# Patient Record
Sex: FEMALE | Race: WHITE | NOT HISPANIC OR LATINO | Employment: UNEMPLOYED | URBAN - METROPOLITAN AREA
[De-identification: names, ages, dates, MRNs, and addresses within clinical notes are randomized per-mention and may not be internally consistent; named-entity substitution may affect disease eponyms.]

---

## 2022-11-02 ENCOUNTER — APPOINTMENT (OUTPATIENT)
Dept: GENERAL RADIOLOGY | Facility: HOSPITAL | Age: 67
End: 2022-11-02

## 2022-11-02 ENCOUNTER — HOSPITAL ENCOUNTER (EMERGENCY)
Facility: HOSPITAL | Age: 67
Discharge: HOME | End: 2022-11-02
Attending: EMERGENCY MEDICINE | Admitting: EMERGENCY MEDICINE

## 2022-11-02 VITALS
OXYGEN SATURATION: 97 % | BODY MASS INDEX: 31.02 KG/M2 | RESPIRATION RATE: 16 BRPM | TEMPERATURE: 98.7 F | HEART RATE: 92 BPM | WEIGHT: 158 LBS | SYSTOLIC BLOOD PRESSURE: 149 MMHG | DIASTOLIC BLOOD PRESSURE: 92 MMHG | HEIGHT: 60 IN

## 2022-11-02 DIAGNOSIS — S42.294A OTHER CLOSED NONDISPLACED FRACTURE OF PROXIMAL END OF RIGHT HUMERUS, INITIAL ENCOUNTER: Primary | ICD-10-CM

## 2022-11-02 PROCEDURE — 73030 X-RAY EXAM OF SHOULDER: CPT

## 2022-11-02 PROCEDURE — 73070 X-RAY EXAM OF ELBOW: CPT

## 2022-11-02 PROCEDURE — 73060 X-RAY EXAM OF HUMERUS: CPT

## 2022-11-02 PROCEDURE — 99283 EMERGENCY DEPT VISIT LOW MDM: CPT

## 2022-11-02 RX ORDER — HYDROCODONE BITARTRATE AND ACETAMINOPHEN 5; 325 MG/1; MG/1
1 TABLET ORAL ONCE AS NEEDED
Status: COMPLETED | OUTPATIENT
Start: 2022-11-02 | End: 2022-11-02

## 2022-11-02 RX ORDER — HYDROCODONE BITARTRATE AND ACETAMINOPHEN 5; 325 MG/1; MG/1
1 TABLET ORAL EVERY 6 HOURS PRN
Qty: 15 TABLET | Refills: 0 | Status: SHIPPED | OUTPATIENT
Start: 2022-11-02

## 2022-11-02 RX ADMIN — HYDROCODONE BITARTRATE AND ACETAMINOPHEN 1 TABLET: 5; 325 TABLET ORAL at 18:14

## 2022-11-02 NOTE — DISCHARGE INSTRUCTIONS
Wear sling and swath, may use ice to shoulder intermittently as needed.  Follow-up with orthopedist, return new or worsening symptoms

## 2022-11-02 NOTE — ED PROVIDER NOTES
Subjective   History of Present Illness  67-year-old female presents with pain to right shoulder.  She states the phone rang and she was getting out of bed when she fell earlier today.  She complains of pain to her shoulder worse with any movement.  She describes moderate severe pain constant in nature.  She has no complaints of other pain or injury.  Review of Systems  Negative for head pain neck pain chest pain abdominal pain positive for right hemiparesis from previous stroke  No past medical history on file.  Stroke, clavicle fracture hypothyroid, hypertension, depression, arthritis hyperlipidemia  No Known Allergies    No past surgical history on file.    No family history on file.    Social History     Socioeconomic History   • Marital status:      She is unsure of her medications she is on lisinopril aspirin Prozac Tylenol No. 3 for arthritis something for cholesterol and her thyroid    Objective   Physical Exam  67-year-old female awake alert.  Generally well-developed well-nourished.  She has no complaints of head neck chest abdomen pain.  No complaints of left arm pain no complaints of hip or leg pain.  Examination of right arm she is noted to have paralysis of right hand.  This is pre-existing.  She has no wrist or elbow tenderness.  She has tenderness proximal humerus at the shoulder.  Procedures           ED Course      No results found for this or any previous visit.  XR Shoulder 2+ View Right    Result Date: 11/2/2022  Transverse impaction fracture of the right humeral neck. The humeral head is high riding raising question of rotator cuff osteoarthropathy.  Electronically Signed By-Angel Reaves MD On:11/2/2022 4:19 PM This report was finalized on [id number] by  Angel Reaves MD.    XR Humerus Right    Result Date: 11/2/2022  Transverse impaction fracture of the right humeral neck. This was discussed in detail under the separately dictated right shoulder x-ray report.  Electronically Signed  "By-Angel Reaves MD On:11/2/2022 4:21 PM This report was finalized on [id number] by  Angel Reaves MD.    XR Elbow 2 View Right    Result Date: 11/2/2022  No acute fracture or dislocation.  Electronically Signed By-Angel Reaves MD On:11/2/2022 4:17 PM This report was finalized on [id number] by  Angel Reaves MD.    Medications   HYDROcodone-acetaminophen (NORCO) 5-325 MG per tablet 1 tablet (1 tablet Oral Given 11/2/22 1814)     /81 (BP Location: Left arm, Patient Position: Sitting)   Pulse 87   Temp 98.7 °F (37.1 °C)   Resp 18   Ht 152.4 cm (60\")   Wt 71.7 kg (158 lb)   SpO2 98%   BMI 30.86 kg/m²                                        MDM  I viewed x-rays Of right humerus and shoulder.  There is a transverse impaction fracture of the right humeral neck.  Patient was given a Norco for pain.  Findings were discussed with her and her .  She was placed in sling and swath.  Advised to ice shoulder as needed.  Inspect report was reviewed she was discharged with prescription for Norco.  Advised to follow-up with orthopedist, return new or worsening symptoms  Final diagnoses:   Other closed nondisplaced fracture of proximal end of right humerus, initial encounter       ED Disposition  ED Disposition     ED Disposition   Discharge    Condition   Stable    Comment   --             Geovany Mendoza MD  [address] [place] [address] [place] [address] [phone]    Schedule an appointment as soon as possible for a visit            Medication List      New Prescriptions    HYDROcodone-acetaminophen 5-325 MG per tablet  Commonly known as: NORCO  Take 1 tablet by mouth Every 6 (Six) Hours As Needed for Moderate Pain.           Where to Get Your Medications      These medications were sent to [facility] [address] [place] [address]    Hours: Mon-Fri 7:00AM-7:00PM Phone: [phone]   · HYDROcodone-acetaminophen 5-325 MG per tablet          Jeremiah Sanders MD  11/02/22 1817    "

## 2022-11-17 ENCOUNTER — HOSPITAL ENCOUNTER (INPATIENT)
Facility: HOSPITAL | Age: 67
LOS: 4 days | Discharge: NURSING, REHAB OR LONG-TERM CARE | DRG: 492 | End: 2022-11-23
Attending: EMERGENCY MEDICINE
Payer: MEDICARE

## 2022-11-17 ENCOUNTER — APPOINTMENT (OUTPATIENT)
Dept: GENERAL RADIOLOGY | Facility: HOSPITAL | Age: 67
DRG: 492 | End: 2022-11-17
Payer: MEDICARE
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
## 2022-11-17 DIAGNOSIS — M25.571 ACUTE RIGHT ANKLE PAIN: ICD-10-CM

## 2022-11-17 DIAGNOSIS — M79.671 ACUTE PAIN OF RIGHT FOOT: ICD-10-CM

## 2022-11-17 PROBLEM — Z86.73 HISTORY OF CVA (CEREBROVASCULAR ACCIDENT): Status: ACTIVE | Noted: 2022-11-17

## 2022-11-17 PROBLEM — R53.1 RIGHT SIDED WEAKNESS: Status: ACTIVE | Noted: 2022-11-17

## 2022-11-17 LAB
ALBUMIN SERPL-MCNC: 4.1 G/DL (ref 3.5–5.2)
ALBUMIN/GLOB SERPL: 1.5 G/DL
ALP SERPL-CCNC: 174 U/L (ref 39–117)
ALT SERPL W P-5'-P-CCNC: 16 U/L (ref 1–33)
ANION GAP SERPL CALCULATED.3IONS-SCNC: 12 MMOL/L (ref 5–15)
AST SERPL-CCNC: 16 U/L (ref 1–32)
BASOPHILS # BLD AUTO: 0 10*3/MM3 (ref 0–0.2)
BASOPHILS NFR BLD AUTO: 0.3 % (ref 0–1.5)
BILIRUB SERPL-MCNC: 0.4 MG/DL (ref 0–1.2)
BUN SERPL-MCNC: 14 MG/DL (ref 8–23)
BUN/CREAT SERPL: 13.2 (ref 7–25)
CALCIUM SPEC-SCNC: 9.7 MG/DL (ref 8.6–10.5)
CHLORIDE SERPL-SCNC: 105 MMOL/L (ref 98–107)
CK MB SERPL-CCNC: <1 NG/ML
CK SERPL-CCNC: 64 U/L (ref 20–180)
CO2 SERPL-SCNC: 23 MMOL/L (ref 22–29)
CREAT SERPL-MCNC: 1.06 MG/DL (ref 0.57–1)
DEPRECATED RDW RBC AUTO: 49.4 FL (ref 37–54)
EGFRCR SERPLBLD CKD-EPI 2021: 57.3 ML/MIN/1.73
EOSINOPHIL # BLD AUTO: 0 10*3/MM3 (ref 0–0.4)
EOSINOPHIL NFR BLD AUTO: 0.3 % (ref 0.3–6.2)
ERYTHROCYTE [DISTWIDTH] IN BLOOD BY AUTOMATED COUNT: 15.4 % (ref 12.3–15.4)
GLOBULIN UR ELPH-MCNC: 2.7 GM/DL
GLUCOSE SERPL-MCNC: 110 MG/DL (ref 65–99)
HCT VFR BLD AUTO: 38.7 % (ref 34–46.6)
HGB BLD-MCNC: 12.6 G/DL (ref 12–15.9)
LYMPHOCYTES # BLD AUTO: 0.7 10*3/MM3 (ref 0.7–3.1)
LYMPHOCYTES NFR BLD AUTO: 6.1 % (ref 19.6–45.3)
MCH RBC QN AUTO: 28.4 PG (ref 26.6–33)
MCHC RBC AUTO-ENTMCNC: 32.4 G/DL (ref 31.5–35.7)
MCV RBC AUTO: 87.5 FL (ref 79–97)
MONOCYTES # BLD AUTO: 0.5 10*3/MM3 (ref 0.1–0.9)
MONOCYTES NFR BLD AUTO: 4.9 % (ref 5–12)
NEUTROPHILS NFR BLD AUTO: 88.4 % (ref 42.7–76)
NEUTROPHILS NFR BLD AUTO: 9.9 10*3/MM3 (ref 1.7–7)
NRBC BLD AUTO-RTO: 0 /100 WBC (ref 0–0.2)
PLATELET # BLD AUTO: 361 10*3/MM3 (ref 140–450)
PMV BLD AUTO: 8.7 FL (ref 6–12)
POTASSIUM SERPL-SCNC: 4 MMOL/L (ref 3.5–5.2)
PROT SERPL-MCNC: 6.8 G/DL (ref 6–8.5)
RBC # BLD AUTO: 4.43 10*6/MM3 (ref 3.77–5.28)
SODIUM SERPL-SCNC: 140 MMOL/L (ref 136–145)
WBC NRBC COR # BLD: 11.2 10*3/MM3 (ref 3.4–10.8)

## 2022-11-17 PROCEDURE — 80053 COMPREHEN METABOLIC PANEL: CPT | Performed by: NURSE PRACTITIONER

## 2022-11-17 PROCEDURE — 25010000002 HYDROMORPHONE 1 MG/ML SOLUTION: Performed by: NURSE PRACTITIONER

## 2022-11-17 PROCEDURE — 25010000002 MORPHINE PER 10 MG: Performed by: NURSE PRACTITIONER

## 2022-11-17 PROCEDURE — 25010000002 ONDANSETRON PER 1 MG: Performed by: NURSE PRACTITIONER

## 2022-11-17 PROCEDURE — 85025 COMPLETE CBC W/AUTO DIFF WBC: CPT | Performed by: NURSE PRACTITIONER

## 2022-11-17 PROCEDURE — 73610 X-RAY EXAM OF ANKLE: CPT

## 2022-11-17 PROCEDURE — 99284 EMERGENCY DEPT VISIT MOD MDM: CPT

## 2022-11-17 PROCEDURE — 73630 X-RAY EXAM OF FOOT: CPT

## 2022-11-17 PROCEDURE — 82550 ASSAY OF CK (CPK): CPT | Performed by: NURSE PRACTITIONER

## 2022-11-17 PROCEDURE — 82553 CREATINE MB FRACTION: CPT | Performed by: NURSE PRACTITIONER

## 2022-11-17 RX ORDER — BISACODYL 10 MG
10 SUPPOSITORY, RECTAL RECTAL DAILY PRN
Status: DISCONTINUED | OUTPATIENT
Start: 2022-11-17 | End: 2022-11-23 | Stop reason: HOSPADM

## 2022-11-17 RX ORDER — ONDANSETRON 2 MG/ML
4 INJECTION INTRAMUSCULAR; INTRAVENOUS ONCE
Status: COMPLETED | OUTPATIENT
Start: 2022-11-17 | End: 2022-11-17

## 2022-11-17 RX ORDER — ONDANSETRON 4 MG/1
4 TABLET, FILM COATED ORAL EVERY 6 HOURS PRN
Status: DISCONTINUED | OUTPATIENT
Start: 2022-11-17 | End: 2022-11-18 | Stop reason: SDUPTHER

## 2022-11-17 RX ORDER — ACETAMINOPHEN 650 MG/1
650 SUPPOSITORY RECTAL EVERY 4 HOURS PRN
Status: DISCONTINUED | OUTPATIENT
Start: 2022-11-17 | End: 2022-11-18 | Stop reason: SDUPTHER

## 2022-11-17 RX ORDER — SODIUM CHLORIDE 0.9 % (FLUSH) 0.9 %
10 SYRINGE (ML) INJECTION AS NEEDED
Status: DISCONTINUED | OUTPATIENT
Start: 2022-11-17 | End: 2022-11-23 | Stop reason: HOSPADM

## 2022-11-17 RX ORDER — BISACODYL 5 MG/1
5 TABLET, DELAYED RELEASE ORAL DAILY PRN
Status: DISCONTINUED | OUTPATIENT
Start: 2022-11-17 | End: 2022-11-23 | Stop reason: HOSPADM

## 2022-11-17 RX ORDER — ACETAMINOPHEN 325 MG/1
650 TABLET ORAL EVERY 4 HOURS PRN
Status: DISCONTINUED | OUTPATIENT
Start: 2022-11-17 | End: 2022-11-18 | Stop reason: SDUPTHER

## 2022-11-17 RX ORDER — POLYETHYLENE GLYCOL 3350 17 G/17G
17 POWDER, FOR SOLUTION ORAL DAILY PRN
Status: DISCONTINUED | OUTPATIENT
Start: 2022-11-17 | End: 2022-11-23 | Stop reason: HOSPADM

## 2022-11-17 RX ORDER — ACETAMINOPHEN 160 MG/5ML
650 SOLUTION ORAL EVERY 4 HOURS PRN
Status: DISCONTINUED | OUTPATIENT
Start: 2022-11-17 | End: 2022-11-18 | Stop reason: SDUPTHER

## 2022-11-17 RX ORDER — CHOLECALCIFEROL (VITAMIN D3) 125 MCG
5 CAPSULE ORAL NIGHTLY PRN
Status: DISCONTINUED | OUTPATIENT
Start: 2022-11-17 | End: 2022-11-23 | Stop reason: HOSPADM

## 2022-11-17 RX ORDER — ONDANSETRON 2 MG/ML
4 INJECTION INTRAMUSCULAR; INTRAVENOUS EVERY 6 HOURS PRN
Status: DISCONTINUED | OUTPATIENT
Start: 2022-11-17 | End: 2022-11-18 | Stop reason: SDUPTHER

## 2022-11-17 RX ORDER — SODIUM CHLORIDE 0.9 % (FLUSH) 0.9 %
10 SYRINGE (ML) INJECTION EVERY 12 HOURS SCHEDULED
Status: DISCONTINUED | OUTPATIENT
Start: 2022-11-17 | End: 2022-11-23 | Stop reason: HOSPADM

## 2022-11-17 RX ORDER — AMOXICILLIN 250 MG
2 CAPSULE ORAL 2 TIMES DAILY
Status: DISCONTINUED | OUTPATIENT
Start: 2022-11-18 | End: 2022-11-23 | Stop reason: HOSPADM

## 2022-11-17 RX ADMIN — HYDROMORPHONE HYDROCHLORIDE 0.5 MG: 1 INJECTION, SOLUTION INTRAMUSCULAR; INTRAVENOUS; SUBCUTANEOUS at 23:42

## 2022-11-17 RX ADMIN — MORPHINE SULFATE 4 MG: 4 INJECTION, SOLUTION INTRAMUSCULAR; INTRAVENOUS at 20:46

## 2022-11-17 RX ADMIN — ONDANSETRON 4 MG: 2 INJECTION INTRAMUSCULAR; INTRAVENOUS at 20:46

## 2022-11-18 ENCOUNTER — APPOINTMENT (OUTPATIENT)
Dept: GENERAL RADIOLOGY | Facility: HOSPITAL | Age: 67
DRG: 492 | End: 2022-11-18
Payer: MEDICARE

## 2022-11-18 ENCOUNTER — ANESTHESIA (OUTPATIENT)
Dept: PERIOP | Facility: HOSPITAL | Age: 67
End: 2022-11-18

## 2022-11-18 ENCOUNTER — ANESTHESIA EVENT (OUTPATIENT)
Dept: PERIOP | Facility: HOSPITAL | Age: 67
End: 2022-11-18

## 2022-11-18 PROBLEM — S82.851A TRIMALLEOLAR FRACTURE OF ANKLE, CLOSED, RIGHT, INITIAL ENCOUNTER: Status: ACTIVE | Noted: 2022-11-18

## 2022-11-18 LAB
ANION GAP SERPL CALCULATED.3IONS-SCNC: 17 MMOL/L (ref 5–15)
BASOPHILS # BLD AUTO: 0 10*3/MM3 (ref 0–0.2)
BASOPHILS NFR BLD AUTO: 0.2 % (ref 0–1.5)
BUN SERPL-MCNC: 17 MG/DL (ref 8–23)
BUN/CREAT SERPL: 11.5 (ref 7–25)
CALCIUM SPEC-SCNC: 9.2 MG/DL (ref 8.6–10.5)
CHLORIDE SERPL-SCNC: 103 MMOL/L (ref 98–107)
CO2 SERPL-SCNC: 20 MMOL/L (ref 22–29)
CREAT SERPL-MCNC: 1.48 MG/DL (ref 0.57–1)
DEPRECATED RDW RBC AUTO: 47.3 FL (ref 37–54)
EGFRCR SERPLBLD CKD-EPI 2021: 38.4 ML/MIN/1.73
EOSINOPHIL # BLD AUTO: 0.1 10*3/MM3 (ref 0–0.4)
EOSINOPHIL NFR BLD AUTO: 0.9 % (ref 0.3–6.2)
ERYTHROCYTE [DISTWIDTH] IN BLOOD BY AUTOMATED COUNT: 14.9 % (ref 12.3–15.4)
GLUCOSE SERPL-MCNC: 103 MG/DL (ref 65–99)
HCT VFR BLD AUTO: 35.6 % (ref 34–46.6)
HGB BLD-MCNC: 11.3 G/DL (ref 12–15.9)
LYMPHOCYTES # BLD AUTO: 1.4 10*3/MM3 (ref 0.7–3.1)
LYMPHOCYTES NFR BLD AUTO: 20.5 % (ref 19.6–45.3)
MCH RBC QN AUTO: 28.4 PG (ref 26.6–33)
MCHC RBC AUTO-ENTMCNC: 31.6 G/DL (ref 31.5–35.7)
MCV RBC AUTO: 89.8 FL (ref 79–97)
MONOCYTES # BLD AUTO: 0.7 10*3/MM3 (ref 0.1–0.9)
MONOCYTES NFR BLD AUTO: 10.3 % (ref 5–12)
NEUTROPHILS NFR BLD AUTO: 4.6 10*3/MM3 (ref 1.7–7)
NEUTROPHILS NFR BLD AUTO: 68.1 % (ref 42.7–76)
NRBC BLD AUTO-RTO: 0.1 /100 WBC (ref 0–0.2)
PLATELET # BLD AUTO: 348 10*3/MM3 (ref 140–450)
PMV BLD AUTO: 9 FL (ref 6–12)
POTASSIUM SERPL-SCNC: 4.2 MMOL/L (ref 3.5–5.2)
RBC # BLD AUTO: 3.97 10*6/MM3 (ref 3.77–5.28)
SODIUM SERPL-SCNC: 140 MMOL/L (ref 136–145)
WBC NRBC COR # BLD: 6.8 10*3/MM3 (ref 3.4–10.8)

## 2022-11-18 PROCEDURE — 0QSJ04Z REPOSITION RIGHT FIBULA WITH INTERNAL FIXATION DEVICE, OPEN APPROACH: ICD-10-PCS | Performed by: ORTHOPAEDIC SURGERY

## 2022-11-18 PROCEDURE — 73600 X-RAY EXAM OF ANKLE: CPT

## 2022-11-18 PROCEDURE — 25010000002 HYDROMORPHONE 1 MG/ML SOLUTION

## 2022-11-18 PROCEDURE — C1713 ANCHOR/SCREW BN/BN,TIS/BN: HCPCS | Performed by: ORTHOPAEDIC SURGERY

## 2022-11-18 PROCEDURE — 25010000002 ROPIVACAINE PER 1 MG: Performed by: ANESTHESIOLOGY

## 2022-11-18 PROCEDURE — 25010000002 MORPHINE PER 10 MG: Performed by: ORTHOPAEDIC SURGERY

## 2022-11-18 PROCEDURE — 25010000002 DEXAMETHASONE PER 1 MG: Performed by: ANESTHESIOLOGY

## 2022-11-18 PROCEDURE — 25010000002 PROPOFOL 1000 MG/100ML EMULSION: Performed by: ANESTHESIOLOGY

## 2022-11-18 PROCEDURE — 0QSG04Z REPOSITION RIGHT TIBIA WITH INTERNAL FIXATION DEVICE, OPEN APPROACH: ICD-10-PCS | Performed by: ORTHOPAEDIC SURGERY

## 2022-11-18 PROCEDURE — 85025 COMPLETE CBC W/AUTO DIFF WBC: CPT

## 2022-11-18 PROCEDURE — G0378 HOSPITAL OBSERVATION PER HR: HCPCS

## 2022-11-18 PROCEDURE — 76000 FLUOROSCOPY <1 HR PHYS/QHP: CPT

## 2022-11-18 PROCEDURE — 94799 UNLISTED PULMONARY SVC/PX: CPT

## 2022-11-18 PROCEDURE — 25010000002 CEFAZOLIN PER 500 MG: Performed by: ORTHOPAEDIC SURGERY

## 2022-11-18 PROCEDURE — 25010000002 FENTANYL CITRATE (PF) 50 MCG/ML SOLUTION: Performed by: ANESTHESIOLOGY

## 2022-11-18 PROCEDURE — 36415 COLL VENOUS BLD VENIPUNCTURE: CPT

## 2022-11-18 PROCEDURE — 25010000002 ONDANSETRON PER 1 MG

## 2022-11-18 PROCEDURE — 76942 ECHO GUIDE FOR BIOPSY: CPT | Performed by: ORTHOPAEDIC SURGERY

## 2022-11-18 PROCEDURE — 25010000002 MIDAZOLAM PER 1 MG: Performed by: ANESTHESIOLOGY

## 2022-11-18 PROCEDURE — 80048 BASIC METABOLIC PNL TOTAL CA: CPT

## 2022-11-18 DEVICE — IMPLANTABLE DEVICE
Type: IMPLANTABLE DEVICE | Site: ANKLE | Status: FUNCTIONAL
Brand: ORTHOLOC

## 2022-11-18 DEVICE — IMPLANTABLE DEVICE
Type: IMPLANTABLE DEVICE | Site: ANKLE | Status: FUNCTIONAL
Brand: ORTHOLOC 3DI PLATING SYSTEM

## 2022-11-18 DEVICE — IMPLANTABLE DEVICE
Type: IMPLANTABLE DEVICE | Site: ANKLE | Status: FUNCTIONAL
Brand: ORTHOLOC 3DI

## 2022-11-18 RX ORDER — SODIUM CHLORIDE 9 MG/ML
75 INJECTION, SOLUTION INTRAVENOUS CONTINUOUS
Status: DISCONTINUED | OUTPATIENT
Start: 2022-11-18 | End: 2022-11-19

## 2022-11-18 RX ORDER — OXYCODONE HYDROCHLORIDE 5 MG/1
5 TABLET ORAL EVERY 4 HOURS PRN
Status: DISCONTINUED | OUTPATIENT
Start: 2022-11-18 | End: 2022-11-23 | Stop reason: HOSPADM

## 2022-11-18 RX ORDER — ACETAMINOPHEN 325 MG/1
325 TABLET ORAL EVERY 4 HOURS PRN
Status: DISCONTINUED | OUTPATIENT
Start: 2022-11-18 | End: 2022-11-23 | Stop reason: HOSPADM

## 2022-11-18 RX ORDER — ROPIVACAINE HYDROCHLORIDE 5 MG/ML
INJECTION, SOLUTION EPIDURAL; INFILTRATION; PERINEURAL
Status: COMPLETED | OUTPATIENT
Start: 2022-11-18 | End: 2022-11-18

## 2022-11-18 RX ORDER — LIDOCAINE HYDROCHLORIDE 10 MG/ML
INJECTION, SOLUTION EPIDURAL; INFILTRATION; INTRACAUDAL; PERINEURAL AS NEEDED
Status: DISCONTINUED | OUTPATIENT
Start: 2022-11-18 | End: 2022-11-18 | Stop reason: SURG

## 2022-11-18 RX ORDER — MIDAZOLAM HYDROCHLORIDE 1 MG/ML
INJECTION INTRAMUSCULAR; INTRAVENOUS
Status: COMPLETED | OUTPATIENT
Start: 2022-11-18 | End: 2022-11-18

## 2022-11-18 RX ORDER — ASPIRIN 325 MG
325 TABLET ORAL DAILY
Status: DISCONTINUED | OUTPATIENT
Start: 2022-11-19 | End: 2022-11-23 | Stop reason: HOSPADM

## 2022-11-18 RX ORDER — OXYCODONE HYDROCHLORIDE 5 MG/1
10 TABLET ORAL EVERY 4 HOURS PRN
Status: DISCONTINUED | OUTPATIENT
Start: 2022-11-18 | End: 2022-11-23 | Stop reason: HOSPADM

## 2022-11-18 RX ORDER — ONDANSETRON 2 MG/ML
4 INJECTION INTRAMUSCULAR; INTRAVENOUS EVERY 6 HOURS PRN
Status: DISCONTINUED | OUTPATIENT
Start: 2022-11-18 | End: 2022-11-23 | Stop reason: HOSPADM

## 2022-11-18 RX ORDER — ONDANSETRON 4 MG/1
4 TABLET, FILM COATED ORAL EVERY 6 HOURS PRN
Status: DISCONTINUED | OUTPATIENT
Start: 2022-11-18 | End: 2022-11-23 | Stop reason: HOSPADM

## 2022-11-18 RX ORDER — SODIUM CHLORIDE 0.9 % (FLUSH) 0.9 %
1-10 SYRINGE (ML) INJECTION AS NEEDED
Status: DISCONTINUED | OUTPATIENT
Start: 2022-11-18 | End: 2022-11-23 | Stop reason: HOSPADM

## 2022-11-18 RX ORDER — PROPOFOL 10 MG/ML
INJECTION, EMULSION INTRAVENOUS CONTINUOUS PRN
Status: DISCONTINUED | OUTPATIENT
Start: 2022-11-18 | End: 2022-11-18 | Stop reason: SURG

## 2022-11-18 RX ORDER — SODIUM CHLORIDE 9 MG/ML
40 INJECTION, SOLUTION INTRAVENOUS AS NEEDED
Status: DISCONTINUED | OUTPATIENT
Start: 2022-11-18 | End: 2022-11-23 | Stop reason: HOSPADM

## 2022-11-18 RX ORDER — POLYETHYLENE GLYCOL 3350 17 G/17G
17 POWDER, FOR SOLUTION ORAL DAILY
Status: DISCONTINUED | OUTPATIENT
Start: 2022-11-19 | End: 2022-11-23 | Stop reason: HOSPADM

## 2022-11-18 RX ORDER — DEXAMETHASONE SODIUM PHOSPHATE 4 MG/ML
INJECTION, SOLUTION INTRA-ARTICULAR; INTRALESIONAL; INTRAMUSCULAR; INTRAVENOUS; SOFT TISSUE
Status: COMPLETED | OUTPATIENT
Start: 2022-11-18 | End: 2022-11-18

## 2022-11-18 RX ORDER — HYDROCODONE BITARTRATE AND ACETAMINOPHEN 7.5; 325 MG/1; MG/1
1 TABLET ORAL EVERY 4 HOURS PRN
Status: DISCONTINUED | OUTPATIENT
Start: 2022-11-18 | End: 2022-11-23 | Stop reason: HOSPADM

## 2022-11-18 RX ORDER — FENTANYL CITRATE 50 UG/ML
INJECTION, SOLUTION INTRAMUSCULAR; INTRAVENOUS
Status: COMPLETED | OUTPATIENT
Start: 2022-11-18 | End: 2022-11-18

## 2022-11-18 RX ORDER — NALOXONE HCL 0.4 MG/ML
0.4 VIAL (ML) INJECTION
Status: DISCONTINUED | OUTPATIENT
Start: 2022-11-18 | End: 2022-11-23 | Stop reason: HOSPADM

## 2022-11-18 RX ADMIN — MIDAZOLAM 1.5 MG: 1 INJECTION INTRAMUSCULAR; INTRAVENOUS at 17:21

## 2022-11-18 RX ADMIN — SODIUM CHLORIDE: 9 INJECTION, SOLUTION INTRAVENOUS at 17:50

## 2022-11-18 RX ADMIN — Medication 10 ML: at 20:53

## 2022-11-18 RX ADMIN — ONDANSETRON 4 MG: 2 INJECTION INTRAMUSCULAR; INTRAVENOUS at 18:52

## 2022-11-18 RX ADMIN — HYDROMORPHONE HYDROCHLORIDE 0.5 MG: 1 INJECTION, SOLUTION INTRAMUSCULAR; INTRAVENOUS; SUBCUTANEOUS at 12:38

## 2022-11-18 RX ADMIN — HYDROMORPHONE HYDROCHLORIDE 0.5 MG: 1 INJECTION, SOLUTION INTRAMUSCULAR; INTRAVENOUS; SUBCUTANEOUS at 01:42

## 2022-11-18 RX ADMIN — FENTANYL CITRATE 25 MCG: 50 INJECTION, SOLUTION INTRAMUSCULAR; INTRAVENOUS at 17:22

## 2022-11-18 RX ADMIN — SENNOSIDES AND DOCUSATE SODIUM 2 TABLET: 50; 8.6 TABLET ORAL at 20:53

## 2022-11-18 RX ADMIN — SODIUM CHLORIDE 75 ML/HR: 9 INJECTION, SOLUTION INTRAVENOUS at 20:54

## 2022-11-18 RX ADMIN — FENTANYL CITRATE 25 MCG: 50 INJECTION, SOLUTION INTRAMUSCULAR; INTRAVENOUS at 17:57

## 2022-11-18 RX ADMIN — SODIUM CHLORIDE 75 ML/HR: 9 INJECTION, SOLUTION INTRAVENOUS at 08:36

## 2022-11-18 RX ADMIN — PROPOFOL INJECTABLE EMULSION 150 MG: 10 INJECTION, EMULSION INTRAVENOUS at 17:56

## 2022-11-18 RX ADMIN — PROPOFOL INJECTABLE EMULSION 100 MCG/KG/MIN: 10 INJECTION, EMULSION INTRAVENOUS at 18:00

## 2022-11-18 RX ADMIN — Medication 10 ML: at 01:39

## 2022-11-18 RX ADMIN — LIDOCAINE HYDROCHLORIDE 30 MG: 10 INJECTION, SOLUTION EPIDURAL; INFILTRATION; INTRACAUDAL; PERINEURAL at 17:56

## 2022-11-18 RX ADMIN — MORPHINE SULFATE 4 MG: 4 INJECTION, SOLUTION INTRAMUSCULAR; INTRAVENOUS at 19:59

## 2022-11-18 RX ADMIN — Medication 10 ML: at 08:36

## 2022-11-18 RX ADMIN — DEXAMETHASONE SODIUM PHOSPHATE 4 MG: 4 INJECTION, SOLUTION INTRAMUSCULAR; INTRAVENOUS at 18:10

## 2022-11-18 RX ADMIN — CEFAZOLIN 2 G: 2 INJECTION, POWDER, FOR SOLUTION INTRAMUSCULAR; INTRAVENOUS at 18:00

## 2022-11-18 RX ADMIN — DEXAMETHASONE SODIUM PHOSPHATE 4 MG: 4 INJECTION, SOLUTION INTRAMUSCULAR; INTRAVENOUS at 17:20

## 2022-11-18 RX ADMIN — SENNOSIDES AND DOCUSATE SODIUM 2 TABLET: 50; 8.6 TABLET ORAL at 08:36

## 2022-11-18 RX ADMIN — ROPIVACAINE HYDROCHLORIDE 30 ML: 5 INJECTION EPIDURAL; INFILTRATION; PERINEURAL at 17:20

## 2022-11-18 RX ADMIN — SODIUM CHLORIDE 1000 ML: 9 INJECTION, SOLUTION INTRAVENOUS at 12:04

## 2022-11-18 NOTE — PROGRESS NOTES
Patient Story (Not Part of Legal Medical Record)  Nursing report ED to floor  Kathe Burt  67 y.o.  female    HPI:   Chief Complaint   Patient presents with    Ankle Injury       Admitting doctor:   Mark Galvin MD    Admitting diagnosis:   The primary encounter diagnosis was Fall, initial encounter. Diagnoses of Acute right ankle pain and Acute pain of right foot were also pertinent to this visit.    Code status:   Current Code Status       Date Active Code Status Order ID Comments User Context       11/17/2022 8824 CPR (Attempt to Resuscitate) [id number]  Kaitlin Ferrari, [facility]        Question Answer    Code Status (Patient has no pulse and is not breathing) CPR (Attempt to Resuscitate)    Medical Interventions (Patient has pulse or is breathing) Full Support                    Allergies:   Patient has no known allergies.    Isolation:  No active isolations     Fall Risk:  Fall Risk Assessment was completed, and patient is at high risk for falls.   Predictive Model Details         8 (Low) Factor Value    Calculated 11/17/2022 22:08 Age 67    Risk of Fall Model Musculoskeletal Assessment WDL     Active Peripheral IV Present     Imaging order in this encounter Present     Respiratory Rate 20     Drug Use Not Asked     Skin Assessment WDL     Financial Class Other     Magnesium not on file     Diastolic BP 69     Epi Scale not on file     Number of Distinct Medication Classes administered 2     Peripheral Vascular Assessment WDL     Tobacco Use Not Asked     Creatinine 1.06 mg/dL     Gastrointestinal Assessment WDL     Number of administrations of Analgesic Narcotics 1     Cardiac Assessment WDL     Albumin 4.1 g/dL     Total Bilirubin 0.4 mg/dL     Days after Admission 0.094     ALT 16 U/L     Potassium 4 mmol/L     Chloride 105 mmol/L     Calcium 9.7 mg/dL         Weight:       11/17/22 1924   Weight: 71.7 kg (158 lb)       Intake and Output  No intake or output data in the 24 hours ending 11/18/22  "0050    Diet:   Dietary Orders (From admission, onward)       Start     Ordered    11/17/22 2305  Diet Regular Texture (IDDSI 7); Regular Consistency; Cardiac Diets; Healthy Heart (2-3 Na+)  Diet Effective Now        Comments: Comments entered here are not received by the  Department and are not considered part of the interpreted diet order. Please use the \"DIET MESSAGE\" order to communicate additional instructions to the diet office. If necessary, consult a registered dietitian.   References:    Diet Order Crosswalk   Question Answer Comment   Texture: Regular Texture (IDDSI 7)    Fluid Consistency: Regular Consistency    Diets: Cardiac Diets    Cardiac Diet: Healthy Heart (2-3 Na+)        11/17/22 2304                     Most recent vitals:   Vitals:    11/17/22 1924 11/17/22 2147 11/17/22 2302 11/17/22 2316   BP: 160/81 118/69 131/55 147/89   BP Location: Right arm      Patient Position: Lying      Pulse: 85 79 79 82   Resp: 20      Temp: 98.6 °F (37 °C)      TempSrc: Oral      SpO2: 98% 97% 93% 97%   Weight: 71.7 kg (158 lb)      Height: 152.4 cm (60\")          Active LDAs/IV Access:   Lines, Drains & Airways       Active LDAs       Name Placement date Placement time Site Days    Peripheral IV 11/17/22 2024 Left Antecubital 11/17/22 2024  Antecubital  less than 1                    Skin Condition:   Skin Assessments (last day)       None             Labs (abnormal labs have a star):   Labs Reviewed   COMPREHENSIVE METABOLIC PANEL - Abnormal; Notable for the following components:       Result Value    Glucose 110 (*)     Creatinine 1.06 (*)     Alkaline Phosphatase 174 (*)     eGFR 57.3 (*)     All other components within normal limits    Narrative:     GFR Normal >60  Chronic Kidney Disease <60  Kidney Failure <15     CBC WITH AUTO DIFFERENTIAL - Abnormal; Notable for the following components:    WBC 11.20 (*)     Neutrophil % 88.4 (*)     Lymphocyte % 6.1 (*)     Monocyte % 4.9 (*)     " Neutrophils, Absolute 9.90 (*)     All other components within normal limits   CK TOTAL AND CKMB - Normal    Narrative:     CKMB results may be falsely decreased if patient taking Biotin.   BASIC METABOLIC PANEL   CBC WITH AUTO DIFFERENTIAL   CBC AND DIFFERENTIAL    Narrative:     The following orders were created for panel order CBC & Differential.  Procedure                               Abnormality         Status                     ---------                               -----------         ------                     CBC Auto Differential[[id number]]        Abnormal            Final result                 Please view results for these tests on the individual orders.   CBC AND DIFFERENTIAL    Narrative:     The following orders were created for panel order CBC & Differential.  Procedure                               Abnormality         Status                     ---------                               -----------         ------                     CBC Auto Differential[[id number]]                                                         Please view results for these tests on the individual orders.       LOC: Person, Place, Time, and Situation    Telemetry:  Med/Surg    Cardiac Monitoring Ordered: no    EKG:   No orders to display       Medications Given in the ED:   Medications   sodium chloride 0.9 % flush 10 mL (has no administration in time range)   sodium chloride 0.9 % flush 10 mL (has no administration in time range)   sodium chloride 0.9 % flush 10 mL (has no administration in time range)   acetaminophen (TYLENOL) tablet 650 mg (has no administration in time range)     Or   acetaminophen (TYLENOL) 160 MG/5ML solution 650 mg (has no administration in time range)     Or   acetaminophen (TYLENOL) suppository 650 mg (has no administration in time range)   sennosides-docusate (PERICOLACE) 8.6-50 MG per tablet 2 tablet (has no administration in time range)     And   polyethylene glycol (MIRALAX) packet 17 g (has no  administration in time range)     And   bisacodyl (DULCOLAX) EC tablet 5 mg (has no administration in time range)     And   bisacodyl (DULCOLAX) suppository 10 mg (has no administration in time range)   ondansetron (ZOFRAN) tablet 4 mg (has no administration in time range)     Or   ondansetron (ZOFRAN) injection 4 mg (has no administration in time range)   melatonin tablet 5 mg (has no administration in time range)   HYDROmorphone (DILAUDID) injection 0.5 mg (has no administration in time range)   morphine injection 4 mg (4 mg Intravenous Given 11/17/22 2046)   ondansetron (ZOFRAN) injection 4 mg (4 mg Intravenous Given 11/17/22 2046)   HYDROmorphone (DILAUDID) injection 0.5 mg (0.5 mg Intravenous Given 11/17/22 2342)       Imaging results:  XR Ankle 3+ View Right    Result Date: 11/17/2022  Comminuted trimalleolar fracture dislocation of the ankle  Electronically Signed ByJovi Oliver On:11/17/2022 10:00 PM This report was finalized on [id number] by  Osmani Oliver, .    XR Foot 3+ View Right    Result Date: 11/17/2022  Large amount soft tissue swelling extending from the ankle dislocation  Irregularity at the medial aspect of the navicular bone may represent fracture or secondary ossification center  Electronically Signed ByJovi Oliver On:11/17/2022 10:01 PM This report was finalized on [id number] by  Osmani Oliver, .     Social issues:   Social History     Socioeconomic History    Marital status:        NIH Stroke Scale:  Interval: (not recorded)  1a. Level of Consciousness: (not recorded)  1b. LOC Questions: (not recorded)  1c. LOC Commands: (not recorded)  2. Best Gaze: (not recorded)  3. Visual: (not recorded)  4. Facial Palsy: (not recorded)  5a. Motor Arm, Left: (not recorded)  5b. Motor Arm, Right: (not recorded)  6a. Motor Leg, Left: (not recorded)  6b. Motor Leg, Right: (not recorded)  7. Limb Ataxia: (not recorded)  8. Sensory: (not recorded)  9. Best Language: (not  recorded)  10. Dysarthria: (not recorded)  11. Extinction and Inattention (formerly Neglect): (not recorded)    Total (NIH Stroke Scale): (not recorded)     Additional notable assessment information:Fractured R arm as well      Nursing report ED to floor:  Verna Willson RN   11/18/22 00:50 EST         Other (Not Part of Legal Medical Record)         ED to Floor Handoff (Not Part of Legal Medical Record)

## 2022-11-18 NOTE — CONSULTS
Referring Provider: Emergency room  Reason for Consultation: Right trimalleolar ankle fracture    Patient Care Team:  Provider, No Known as PCP - General      Subjective .     History of present illness:  Kathe Burt is a 67 y.o. female who presents with fall sustaining a right trimalleolar ankle fracture.  Patient has had a right-sided stroke which leaves her very weak on the right upper and lower extremities.  Had a fall last week sustaining a right proximal humerus fracture.  She has not seen orthopedic surgeon for this yet but has follow-up scheduled.  She is currently in a sling.  She gets around in a wheelchair.  Pain is moderate.    Review of Systems:    Chest pain shortness of breath fevers or chills      History  Past Medical History:   Diagnosis Date   • Stroke (HCC)      History reviewed. No pertinent surgical history.  History reviewed. No pertinent family history.   Social History     Tobacco Use   • Smoking status: Never     Passive exposure: Never   • Smokeless tobacco: Never   Vaping Use   • Vaping Use: Never used     Medications Prior to Admission   Medication Sig Dispense Refill Last Dose   • HYDROcodone-acetaminophen (NORCO) 5-325 MG per tablet Take 1 tablet by mouth Every 6 (Six) Hours As Needed for Moderate Pain. 15 tablet 0       Patient has no known allergies.    Scheduled Meds:ceFAZolin, 2 g, Intravenous, Once  senna-docusate sodium, 2 tablet, Oral, BID  sodium chloride, 10 mL, Intravenous, Q12H      Continuous Infusions:sodium chloride, 75 mL/hr      PRN Meds:.•  acetaminophen **OR** acetaminophen **OR** acetaminophen  •  senna-docusate sodium **AND** polyethylene glycol **AND** bisacodyl **AND** bisacodyl  •  HYDROmorphone  •  melatonin  •  ondansetron **OR** ondansetron  •  oxyCODONE  •  [COMPLETED] Insert Peripheral IV **AND** sodium chloride  •  sodium chloride    Objective     Vital Signs   Vitals:    11/17/22 2316 11/18/22 0122 11/18/22 0254 11/18/22 0306   BP: 147/89 102/70   92/40   BP Location:  Left arm     Patient Position:  Lying     Pulse: 82 90 80    Resp:  20 20    Temp:  97.7 °F (36.5 °C) 97.6 °F (36.4 °C)    TempSrc:  Oral Oral    SpO2: 97% 95% 92%    Weight:  74.1 kg (163 lb 5.8 oz) 74.1 kg (163 lb 5.8 oz)    Height:             Physical Exam:   Pleasant female, in apparent stress, alert and orient x3, mildly obese  Right ankle is in a posterior splint in plantarflexion.  Limbs have fair sensation.  Able to flex and extend the great toe.  Good capillary refill  X-rays show a comminuted Young B lateral malleolar fracture with a comminuted medial malleolar fracture and a mildly displaced posterior fracture involving about 10% the joint surface    Results Review:   I reviewed the patient's new clinical results.  I reviewed the patient's new imaging results    Lab Results (last 24 hours)     Procedure Component Value Units Date/Time    Basic Metabolic Panel [[id number]]  (Abnormal) Collected: 11/18/22 0510    Specimen: Blood Updated: 11/18/22 0647     Glucose 103 mg/dL      BUN 17 mg/dL      Creatinine 1.48 mg/dL      Sodium 140 mmol/L      Potassium 4.2 mmol/L      Chloride 103 mmol/L      CO2 20.0 mmol/L      Calcium 9.2 mg/dL      BUN/Creatinine Ratio 11.5     Anion Gap 17.0 mmol/L      eGFR 38.4 mL/min/1.73      Comment: National Kidney Foundation and American Society of Nephrology (ASN) Task Force recommended calculation based on the Chronic Kidney Disease Epidemiology Collaboration (CKD-EPI) equation refit without adjustment for race.       Narrative:      GFR Normal >60  Chronic Kidney Disease <60  Kidney Failure <15      CBC & Differential [[id number]]  (Abnormal) Collected: 11/18/22 0510    Specimen: Blood Updated: 11/18/22 0606    Narrative:      The following orders were created for panel order CBC & Differential.  Procedure                               Abnormality         Status                     ---------                               -----------         ------                      CBC Auto Differential[[id number]]        Abnormal            Final result                 Please view results for these tests on the individual orders.    CBC Auto Differential [[id number]]  (Abnormal) Collected: 11/18/22 0510    Specimen: Blood Updated: 11/18/22 0606     WBC 6.80 10*3/mm3      RBC 3.97 10*6/mm3      Hemoglobin 11.3 g/dL      Hematocrit 35.6 %      MCV 89.8 fL      MCH 28.4 pg      MCHC 31.6 g/dL      RDW 14.9 %      RDW-SD 47.3 fl      MPV 9.0 fL      Platelets 348 10*3/mm3      Neutrophil % 68.1 %      Lymphocyte % 20.5 %      Monocyte % 10.3 %      Eosinophil % 0.9 %      Basophil % 0.2 %      Neutrophils, Absolute 4.60 10*3/mm3      Lymphocytes, Absolute 1.40 10*3/mm3      Monocytes, Absolute 0.70 10*3/mm3      Eosinophils, Absolute 0.10 10*3/mm3      Basophils, Absolute 0.00 10*3/mm3      nRBC 0.1 /100 WBC     CK Total & CKMB [[id number]]  (Normal) Collected: 11/17/22 2024    Specimen: Blood Updated: 11/17/22 2055     CKMB <1.00 ng/mL      Creatine Kinase 64 U/L     Narrative:      CKMB results may be falsely decreased if patient taking Biotin.    Comprehensive Metabolic Panel [[id number]]  (Abnormal) Collected: 11/17/22 2024    Specimen: Blood Updated: 11/17/22 2054     Glucose 110 mg/dL      BUN 14 mg/dL      Creatinine 1.06 mg/dL      Sodium 140 mmol/L      Potassium 4.0 mmol/L      Chloride 105 mmol/L      CO2 23.0 mmol/L      Calcium 9.7 mg/dL      Total Protein 6.8 g/dL      Albumin 4.10 g/dL      ALT (SGPT) 16 U/L      AST (SGOT) 16 U/L      Alkaline Phosphatase 174 U/L      Total Bilirubin 0.4 mg/dL      Globulin 2.7 gm/dL      A/G Ratio 1.5 g/dL      BUN/Creatinine Ratio 13.2     Anion Gap 12.0 mmol/L      eGFR 57.3 mL/min/1.73      Comment: National Kidney Foundation and American Society of Nephrology (ASN) Task Force recommended calculation based on the Chronic Kidney Disease Epidemiology Collaboration (CKD-EPI) equation refit without adjustment for race.       Narrative:       GFR Normal >60  Chronic Kidney Disease <60  Kidney Failure <15      CBC & Differential [[id number]]  (Abnormal) Collected: 11/17/22 2024    Specimen: Blood Updated: 11/17/22 2035    Narrative:      The following orders were created for panel order CBC & Differential.  Procedure                               Abnormality         Status                     ---------                               -----------         ------                     CBC Auto Differential[[id number]]        Abnormal            Final result                 Please view results for these tests on the individual orders.    CBC Auto Differential [[id number]]  (Abnormal) Collected: 11/17/22 2024    Specimen: Blood Updated: 11/17/22 2035     WBC 11.20 10*3/mm3      RBC 4.43 10*6/mm3      Hemoglobin 12.6 g/dL      Hematocrit 38.7 %      MCV 87.5 fL      MCH 28.4 pg      MCHC 32.4 g/dL      RDW 15.4 %      RDW-SD 49.4 fl      MPV 8.7 fL      Platelets 361 10*3/mm3      Neutrophil % 88.4 %      Lymphocyte % 6.1 %      Monocyte % 4.9 %      Eosinophil % 0.3 %      Basophil % 0.3 %      Neutrophils, Absolute 9.90 10*3/mm3      Lymphocytes, Absolute 0.70 10*3/mm3      Monocytes, Absolute 0.50 10*3/mm3      Eosinophils, Absolute 0.00 10*3/mm3      Basophils, Absolute 0.00 10*3/mm3      nRBC 0.0 /100 WBC           Imaging Results (Last 24 Hours)     Procedure Component Value Units Date/Time    XR Foot 3+ View Right [[id number]] Collected: 11/17/22 2200     Updated: 11/17/22 2203    Narrative:      DATE OF EXAM:  11/17/2022 8:46 PM     PROCEDURE:  XR FOOT 3+ VW RIGHT-     INDICATIONS:  fall / pain     COMPARISON:  No Comparisons Available     TECHNIQUE:   A minimum of three routine standard radiographic views were obtained of  the right foot.     FINDINGS:  Subtle findings are limited by diffuse osteopenia. Diffuse soft tissue  swelling extending from the ankle to the foot noted. Comminuted  fractures of the distal tibia and fibula are noted.     Irregularity  at the medial aspect of the navicular bone is  age-indeterminate. No acute osseous abnormality of the right foot  otherwise noted        Impression:      Large amount soft tissue swelling extending from the ankle dislocation     Irregularity at the medial aspect of the navicular bone may represent  fracture or secondary ossification center     Electronically Signed By-Osmani Oliver On:11/17/2022 10:01 PM  This report was finalized on [id number] by  Osmani Oliver, .    XR Ankle 3+ View Right [[id number]] Collected: 11/17/22 2158     Updated: 11/17/22 2202    Narrative:      DATE OF EXAM:  11/17/2022 8:45 PM     PROCEDURE:  XR ANKLE 3+ VW RIGHT-     INDICATIONS:  fall / deformity     COMPARISON:  No Comparisons Available     TECHNIQUE:   A minimum of three radiologic views of the right ankle were obtained.     FINDINGS:  Comminuted fracture of the distal fibula involving the distal metaphysis  extending to the metaphysis noted with mild posterior displacement.  Fracture of the medial malleolus in the posterior malleolus noted. Mild  lateral displacement of the ankle joint is noted. Possible remote  fractures off the inferior aspect of the lateral malleolus noted. There  is a large amount of overlying soft tissue swelling.        Impression:      Comminuted trimalleolar fracture dislocation of the ankle     Electronically Signed ByJovi Oliver On:11/17/2022 10:00 PM  This report was finalized on [id number] by  Osmani Oliver, .            Assessment & Plan     Right trimalleolar ankle fracture    Explained she would do best with open reduction internal fixation to avoid malunion.  She understands risk.  Patient understands the risks.  These include bleeding, infection, damage to nerves or vessels, malunion, nonunion, possible need for further surgery, pain, and risk of medical or anesthetic complications including risk of death.  Plan on surgery for 4:00 today.  She will be nonweightbearing on this  for 6 weeks afterwards      Taran Marie MD  11/18/22  08:21 EST

## 2022-11-18 NOTE — ANESTHESIA PROCEDURE NOTES
Airway  Urgency: elective    Date/Time: 11/18/2022 5:58 PM  Airway not difficult    General Information and Staff    Patient location during procedure: OR  Anesthesiologist: Deondre Nino MD    Indications and Patient Condition  Indications for airway management: airway protection    Preoxygenated: yes  MILS maintained throughout  Mask difficulty assessment: 0 - not attempted    Final Airway Details  Final airway type: supraglottic airway      Successful airway: classic and LMA  Size 3     Number of attempts at approach: 1  Assessment: lips, teeth, and gum same as pre-op and atraumatic intubation    Additional Comments  Induction and LMA in hospital bed uneventful, transfer to OR table easily

## 2022-11-18 NOTE — ED NOTES
Pt presents to ED via EMS c/o R ankle and foot pain after a fall at home today. Ankle is visibly swollen and bruised. Pt is currently in an R arm sling. Pt states that she fell about a week ago. Pt states that she has right sided paralysis due to a stroke 18 years ago

## 2022-11-18 NOTE — PROGRESS NOTES
[facility] Medicine Services        Patient Name: Kathe Burt  : 1955  MRN: [id number]  Primary Care Physician:  Provider, No Known  Date of admission: 2022        Subjective       Chief Complaint: Right ankle pain following a fall     History of Present Illness: Kathe Burt is a 67 y.o. female with a past medical history of a CVA approximately 18 years ago with residual right-sided weakness who presented to [facility] on 2022 complaining of right ankle pain following a fall.  She states that she was using the restroom and when she stood up she lost her balance and fell to the ground landing on her right side.  She was unable to get up from the fall.  Her family found her approximately 2 hours later and called EMS.  She denies hitting her head or any loss of consciousness.  She describes her pain is located in her right lateral heel, at throbbing and she rates it currently a 9 out of 10.  She denies any weakness or dizziness prior to the fall, she reports that she did not take any pain medication prior to the fall.  Chest pain, shortness of breath.  She does state that this is her second fall recently.  On 2022 she fell out of bed and sustained a right proximal humeral neck fracture.  Her arm was placed in a sling and and she released from the [facility] ED to follow-up with orthopedic MD outpatient.  She denies sustaining any new injury to her arm in the most recent fall.     In the ED, an x-ray of her right ankle shows a comminuted trimalleolar fracture dislocation of the ankle.  There is a large amount of soft tissue swelling extending from the ankle dislocation.  There is irregularity at the medial aspect of the navicular bone which may represent fracture or secondary ossification center.  Creatinine is 1.06, WBC 11.2, CK is 64, CK-MB less than 1.  All other labs are unremarkable.  She is afebrile, all vital signs are stable.  Her right  extremity was placed in a posterior splint and stirrup plaster splint in the ED.  Orthopedic surgery was consulted and plan to take patient to the OR tomorrow afternoon.  Hospitalist was consulted for further care and management.   11/18/2022; patient is lying in the bed, still complaining of a lot of pain right lower extremity hemodynamically stable patient to go for ORIF this afternoon.     Review of Systems   Constitutional: Negative.   HENT: Negative.    Eyes: Negative.    Cardiovascular: Negative.    Respiratory: Negative.    Hematologic/Lymphatic: Negative.    Skin: Negative.    Musculoskeletal: Positive for falls and joint pain (Right ankle).   Gastrointestinal: Negative.    Genitourinary: Negative.    Neurological: Negative.    Psychiatric/Behavioral: Negative.    Allergic/Immunologic: Negative.          Personal History      Medical History        Past Medical History:   Diagnosis Date   • Stroke (HCC)              Surgical History   History reviewed. No pertinent surgical history.        Family History: family history is not on file. Otherwise pertinent FHx was reviewed and not pertinent to current issue.     Social History:       Home Medications:           Prior to Admission Medications      Prescriptions Last Dose Informant Patient Reported? Taking?     HYDROcodone-acetaminophen (NORCO) 5-325 MG per tablet     No No     Take 1 tablet by mouth Every 6 (Six) Hours As Needed for Moderate Pain.                Allergies:  No Known Allergies     Objective       Vitals:   Temp:  [97.6 °F (36.4 °C)-98.6 °F (37 °C)] 97.7 °F (36.5 °C)  Heart Rate:  [79-93] 93  Resp:  [15-20] 15  BP: ()/(40-89) 94/51     Physical Exam  Vitals and nursing note reviewed.   Constitutional:       Appearance: Normal appearance.   HENT:      Head: Normocephalic and atraumatic.      Nose: Nose normal.      Mouth/Throat:      Mouth: Mucous membranes are moist.   Eyes:      Extraocular Movements: Extraocular movements intact.       Pupils: Pupils are equal, round, and reactive to light.   Cardiovascular:      Rate and Rhythm: Normal rate and regular rhythm.      Pulses: Normal pulses.      Heart sounds: Normal heart sounds.   Pulmonary:      Effort: Pulmonary effort is normal.      Breath sounds: Normal breath sounds.   Abdominal:      General: Bowel sounds are normal.      Palpations: Abdomen is soft.   Musculoskeletal:         General: Normal range of motion.      Right upper arm: Tenderness (Right arm in a sling due to previous fracture) present.      Cervical back: Normal range of motion.      Right lower leg: Tenderness (Right lower extremity splinted due to fracture and in stirrup.) present.      Comments: Capillary refill less than 2 on right lower extremity, sensation is intact   Skin:     General: Skin is warm and dry.   Neurological:      General: No focal deficit present.      Mental Status: She is alert and oriented to person, place, and time. Mental status is at baseline.   Psychiatric:         Mood and Affect: Mood normal.         Behavior: Behavior normal.         Result Review    Result Review:  I have personally reviewed the results from the time of this admission to 11/18/2022 00:57 EST and agree with these findings:  [x]?  Laboratory  []?  Microbiology  [x]?  Radiology  []?  EKG/Telemetry   []?  Cardiology/Vascular   []?  Pathology  []?  Old records  []?  Other:  Most notable findings include: As above     Assessment & Plan          Active Hospital Problems:       Active Hospital Problems     Diagnosis     • **Trimalleolar fracture of ankle, closed, right, initial encounter     • Fall, initial encounter     • History of CVA (cerebrovascular accident)     • Right sided weakness due to previous CVA        Plan:      Trimalleolar fracture of ankle  -Ankle x-ray reviewed  -RLE placed in posterior splint and stirrup plaster splint in ED  -Orthopedic surgeon consult appreciated for ORIF this afternoon  -Pain medication ordered as  needed  -Patient will need PT/OT following surgery due to multiple recent falls with injuries   -Case management consult for possible rehab placement due to multiple recent falls with injury     Patient has a previous history of a CVA approximately 18 years ago  -Residual right-sided weakness noted  -Patient reports no home medications        DVT prophylaxis:  Mechanical DVT prophylaxis orders are present.     CODE STATUS:    Code Status (Patient has no pulse and is not breathing): CPR (Attempt to Resuscitate)  Medical Interventions (Patient has pulse or is breathing): Full Support     Admission Status:  I believe this patient meets inpatient status.     I discussed the patient's findings and my recommendations with patient.     This patient has been examined wearing appropriate Personal Protective Equipment.    Electronically signed by Theresa Bryant MD, 11/18/22, 4:34 PM EST.

## 2022-11-18 NOTE — ANESTHESIA PREPROCEDURE EVALUATION
Anesthesia Evaluation     Patient summary reviewed and Nursing notes reviewed   NPO Solid Status: > 8 hours  NPO Liquid Status: > 8 hours           Airway   Mallampati: II  TM distance: >3 FB  Neck ROM: full  No difficulty expected  Dental - normal exam     Pulmonary - normal exam   Cardiovascular - normal exam  Exercise tolerance: poor (<4 METS)        Neuro/Psych  (+) CVA residual symptoms,    GI/Hepatic/Renal/Endo      Musculoskeletal     Abdominal  - normal exam    Bowel sounds: normal.   Substance History      OB/GYN          Other        ROS/Med Hx Other: Hx stroke, rt hemiparesis. Uses walker to ambulate.                Anesthesia Plan    ASA 3     general with block     intravenous induction     Anesthetic plan, risks, benefits, and alternatives have been provided, discussed and informed consent has been obtained with: patient.    Use of blood products discussed with patient  Consented to blood products.       CODE STATUS:    Code Status (Patient has no pulse and is not breathing): CPR (Attempt to Resuscitate)  Medical Interventions (Patient has pulse or is breathing): Full Support

## 2022-11-18 NOTE — CASE MANAGEMENT/SOCIAL WORK
Discharge Planning Assessment   [facility]     Patient Name: Kathe Burt  MRN: [id number]  Today's Date: 11/18/2022    Admit Date: 11/17/2022    Plan: From home with spouse. PT/OT evals pending. If SNF recommended, will require precert.   Discharge Needs Assessment     Row Name 11/18/22 1424       Living Environment    People in Home spouse    Name(s) of People in Home -Virg    Current Living Arrangements home    Primary Care Provided by self;spouse/significant other    Provides Primary Care For no one, unable/limited ability to care for self    Family Caregiver if Needed spouse;grandchild(jourdan), adult;other (see comments)    Family Caregiver Names -Virg, Grandson-Scooter, Neighbor-Tila    Quality of Family Relationships helpful;involved;supportive    Able to Return to Prior Arrangements yes       Resource/Environmental Concerns    Resource/Environmental Concerns none    Transportation Concerns none       Transition Planning    Patient/Family Anticipates Transition to inpatient rehabilitation facility    Patient/Family Anticipated Services at Transition skilled nursing    Transportation Anticipated family or friend will provide       Discharge Needs Assessment    Readmission Within the Last 30 Days no previous admission in last 30 days    Equipment Currently Used at Home cane, quad;wheelchair;rollator;grab bar;bp cuff;sling    Concerns to be Addressed care coordination/care conferences;discharge planning    Anticipated Changes Related to Illness none    Equipment Needed After Discharge none    Outpatient/Agency/Support Group Needs skilled nursing facility    Discharge Facility/Level of Care Needs nursing facility, skilled    Provided Post Acute Provider List? Yes    Post Acute Provider List Nursing Home    Delivered To Patient    Method of Delivery In person    Current Discharge Risk physical impairment;dependent with mobility/activities of daily living               Discharge Plan     Row Name 11/18/22  1426       Plan    Plan From home with spouse. PT/OT evals pending. If SNF recommended, will require precert.    Patient/Family in Agreement with Plan yes    Plan Comments Met with patient at bedside. Pt lives at home with her  Sergio. She nor her  drive but pt reports independence with ADL's. PCP is through [facility] in [place] (MARY Art contacted office and obtained PCP info; CM updated in chart). Pharmacy confirmed. DME includes quad cane, rollator, wheelchair, BP cuff, and grab bars. Pt denies being current with any HHC/PT/in-home services. Discussed probable need for rehab d/t broken shoulder and ankle. Provided nursing home list for patient to review with her spouse and CM contact info for her to call with choices. Pt to go to OR today 11/18, then will need PT/OT evaluations.              Expected Discharge Date and Time     Expected Discharge Date Expected Discharge Time    Nov 21, 2022          Demographic Summary     Row Name 11/18/22 1423       General Information    Admission Type observation    Arrived From emergency department    Referral Source admission list    Reason for Consult discharge planning;care coordination/care conference    Preferred Language English       Contact Information    Permission Granted to Share Info With                Functional Status     Row Name 11/18/22 1424       Functional Status    Usual Activity Tolerance moderate    Current Activity Tolerance moderate       Functional Status, IADL    Medications assistive person    Meal Preparation assistive person    Housekeeping assistive person    Laundry assistive person    Shopping assistive person               Patient Forms     Row Name 11/18/22 1340       Patient Forms    Patient Observation Letter Delivered  Spoke with patient via room phone. Patient verbalized understanding and declined copy.    Delivered to Support person    Method of delivery Telephone              Met with patient in room wearing  PPE: mask, face shield/goggles.      Maintained distance greater than six feet and spent less than 15 minutes in the room.      Megan Naegele, RN      Office Phone: [phone]  Office Cell: [phone]

## 2022-11-18 NOTE — PLAN OF CARE
Goal Outcome Evaluation:   Patient requiring pain medication during shift for pain in right ankle. Patient is scheduled for surgery this afternoon, consent has been signed.

## 2022-11-18 NOTE — H&P
[facility] Medicine Services      Patient Name: Kathe Burt  : 1955  MRN: [id number]  Primary Care Physician:  Provider, No Known  Date of admission: 2022      Subjective      Chief Complaint: Right ankle pain following a fall    History of Present Illness: Kathe Burt is a 67 y.o. female with a past medical history of a CVA approximately 18 years ago with residual right-sided weakness who presented to [facility] on 2022 complaining of right ankle pain following a fall.  She states that she was using the restroom and when she stood up she lost her balance and fell to the ground landing on her right side.  She was unable to get up from the fall.  Her family found her approximately 2 hours later and called EMS.  She denies hitting her head or any loss of consciousness.  She describes her pain is located in her right lateral heel, at throbbing and she rates it currently a 9 out of 10.  She denies any weakness or dizziness prior to the fall, she reports that she did not take any pain medication prior to the fall.  Chest pain, shortness of breath.  She does state that this is her second fall recently.  On 2022 she fell out of bed and sustained a right proximal humeral neck fracture.  Her arm was placed in a sling and and she released from the [facility] ED to follow-up with orthopedic MD outpatient.  She denies sustaining any new injury to her arm in the most recent fall.    In the ED, an x-ray of her right ankle shows a comminuted trimalleolar fracture dislocation of the ankle.  There is a large amount of soft tissue swelling extending from the ankle dislocation.  There is irregularity at the medial aspect of the navicular bone which may represent fracture or secondary ossification center.  Creatinine is 1.06, WBC 11.2, CK is 64, CK-MB less than 1.  All other labs are unremarkable.  She is afebrile, all vital signs are stable.  Her right extremity  was placed in a posterior splint and stirrup plaster splint in the ED.  Orthopedic surgery was consulted and plan to take patient to the OR tomorrow afternoon.  Hospitalist was consulted for further care and management.      Review of Systems   Constitutional: Negative.   HENT: Negative.    Eyes: Negative.    Cardiovascular: Negative.    Respiratory: Negative.    Hematologic/Lymphatic: Negative.    Skin: Negative.    Musculoskeletal: Positive for falls and joint pain (Right ankle).   Gastrointestinal: Negative.    Genitourinary: Negative.    Neurological: Negative.    Psychiatric/Behavioral: Negative.    Allergic/Immunologic: Negative.        Personal History     Past Medical History:   Diagnosis Date   • Stroke (HCC)        History reviewed. No pertinent surgical history.    Family History: family history is not on file. Otherwise pertinent FHx was reviewed and not pertinent to current issue.    Social History:      Home Medications:  Prior to Admission Medications     Prescriptions Last Dose Informant Patient Reported? Taking?    HYDROcodone-acetaminophen (NORCO) 5-325 MG per tablet   No No    Take 1 tablet by mouth Every 6 (Six) Hours As Needed for Moderate Pain.            Allergies:  No Known Allergies    Objective      Vitals:   Temp:  [98.6 °F (37 °C)] 98.6 °F (37 °C)  Heart Rate:  [79-85] 82  Resp:  [20] 20  BP: (118-160)/(55-89) 147/89    Physical Exam  Vitals and nursing note reviewed.   Constitutional:       Appearance: Normal appearance.   HENT:      Head: Normocephalic and atraumatic.      Nose: Nose normal.      Mouth/Throat:      Mouth: Mucous membranes are moist.   Eyes:      Extraocular Movements: Extraocular movements intact.      Pupils: Pupils are equal, round, and reactive to light.   Cardiovascular:      Rate and Rhythm: Normal rate and regular rhythm.      Pulses: Normal pulses.      Heart sounds: Normal heart sounds.   Pulmonary:      Effort: Pulmonary effort is normal.      Breath sounds:  Normal breath sounds.   Abdominal:      General: Bowel sounds are normal.      Palpations: Abdomen is soft.   Musculoskeletal:         General: Normal range of motion.      Right upper arm: Tenderness (Right arm in a sling due to previous fracture) present.      Cervical back: Normal range of motion.      Right lower leg: Tenderness (Right lower extremity splinted due to fracture.) present.      Comments: Capillary refill less than 2 on right lower extremity, sensation is intact   Skin:     General: Skin is warm and dry.   Neurological:      General: No focal deficit present.      Mental Status: She is alert and oriented to person, place, and time. Mental status is at baseline.   Psychiatric:         Mood and Affect: Mood normal.         Behavior: Behavior normal.       Result Review    Result Review:  I have personally reviewed the results from the time of this admission to 11/18/2022 00:57 EST and agree with these findings:  [x]  Laboratory  []  Microbiology  [x]  Radiology  []  EKG/Telemetry   []  Cardiology/Vascular   []  Pathology  []  Old records  []  Other:  Most notable findings include: As above    Assessment & Plan        Active Hospital Problems:  Active Hospital Problems    Diagnosis    • **Trimalleolar fracture of ankle, closed, right, initial encounter    • Fall, initial encounter    • History of CVA (cerebrovascular accident)    • Right sided weakness due to previous CVA      Plan:     Trimalleolar fracture of ankle  -Ankle x-ray reviewed  -RLE placed in posterior splint and stirrup plaster splint in ED  -Orthopedic surgeon consulted with plans for OR tomorrow afternoon  -Pain medication ordered as needed  -Patient will need PT/OT following surgery due to multiple recent falls with injuries   -Case management consult for possible rehab placement due to multiple recent falls with injury    Patient has a previous history of a CVA approximately 18 years ago  -Residual right-sided weakness noted  -Patient  reports no home medications      DVT prophylaxis:  Mechanical DVT prophylaxis orders are present.    CODE STATUS:    Code Status (Patient has no pulse and is not breathing): CPR (Attempt to Resuscitate)  Medical Interventions (Patient has pulse or is breathing): Full Support    Admission Status:  I believe this patient meets inpatient status.    I discussed the patient's findings and my recommendations with patient.    This patient has been examined wearing appropriate Personal Protective Equipment 11/18/22      Signature: Electronically signed by Kaitlin Ferrari DNP, APRN, 11/18/22, 12:44 AM EST.

## 2022-11-18 NOTE — PLAN OF CARE
Goal Outcome Evaluation:      Pt. To undergo RLE ORIF this date and will be NWB x 6 weeks, will assess following sx.

## 2022-11-18 NOTE — ANESTHESIA PROCEDURE NOTES
Peripheral Block      Patient location during procedure: pre-op  Start time: 11/18/2022 5:20 PM  Stop time: 11/18/2022 5:36 PM  Reason for block: at surgeon's request and post-op pain management  Performed by  Anesthesiologist: Deondre Nino MD  Preanesthetic Checklist  Completed: patient identified, IV checked, site marked, risks and benefits discussed, surgical consent, monitors and equipment checked, pre-op evaluation and timeout performed  Prep:  Pt Position: supine  Prep: ChloraPrep  Patient monitoring: blood pressure monitoring, continuous pulse oximetry and EKG  Procedure    Sedation: yes  Performed under: local infiltration  Guidance:ultrasound guided  Images:still images obtained, printed/placed on chart    Laterality:right  Block Type:adductor canal block and popliteal  Injection Technique:single-shot  Needle Type:echogenic  Needle Gauge:20 G  Resistance on Injection: none  Sedation medications used: fentaNYL citrate (PF) (SUBLIMAZE) injection - Intravenous   25 mcg - 11/18/2022 5:22:00 PM  midazolam (VERSED) injection - Intravenous   1.5 mg - 11/18/2022 5:21:00 PM  Medications Used: dexamethasone (DECADRON) injection - Injection   4 mg - 11/18/2022 5:20:00 PM  ropivacaine (NAROPIN) 0.5 % injection - Injection   30 mL - 11/18/2022 5:20:00 PM      Medications  Comment:Popliteal 20ml, adductor canal 10ml    Post Assessment  Injection Assessment: negative aspiration for heme, no paresthesia on injection and incremental injection  Patient Tolerance:comfortable throughout block  Complications:no

## 2022-11-18 NOTE — CONSULTS
[facility] CONSULT NOTE    Referring Provider: Dr. TOMMY Bryant  Reason for Consultation: Acute kidney injury    Chief complaint .  Status post fall, ankle pain    History of present illness:    Patient is 67 years old  female with a history of stroke, right-sided weakness, admitted after a fall and she sustained a right ankle fracture.  As per patient she lost her balance while putting her pants on and twisted her ankle.  Patient also has history of right proximal humerus fracture in the past.  She normally uses a wheelchair.  Denied any fever, dizziness, cough, expectoration, nausea, vomiting.  Her blood pressure has been running low since last night And her creatinine increased to 1.48 mg/DL this morning    History  Past Medical History:   Diagnosis Date   • Stroke (HCC)      History reviewed. No pertinent surgical history.  Social History     Tobacco Use   • Smoking status: Never     Passive exposure: Never   • Smokeless tobacco: Never   Vaping Use   • Vaping Use: Never used     History reviewed. No pertinent family history.    Review of Systems  ROS  As per HPI  Objective     Vital Signs  Temp:  [97.6 °F (36.4 °C)-98.6 °F (37 °C)] 98 °F (36.7 °C)  Heart Rate:  [79-90] 85  Resp:  [16-20] 16  BP: ()/(40-89) 92/61    No intake/output data recorded.  No intake/output data recorded.    Physical Exam:  Physical Exam    General Appearance: alert, oriented x 3, no acute distress   Skin: warm and dry  HEENT: oral mucosa Dry, nonicteric sclera  Neck: supple, no JVD  Lungs: CTA  Heart: RRR, normal S1 and S2  Abdomen: soft, nontender, nondistended  : no palpable bladder  Extremities: Right arm in sling.no edema, cyanosis or clubbing       Results Review:   I reviewed the patient's new clinical results.    Lab Results   Component Value Date    CALCIUM 9.2 11/18/2022     Results from last 7 days   Lab Units 11/18/22  0510 11/17/22 2024   SODIUM mmol/L 140 140   POTASSIUM mmol/L 4.2 4.0   CHLORIDE mmol/L  103 105   CO2 mmol/L 20.0* 23.0   BUN mg/dL 17 14   CREATININE mg/dL 1.48* 1.06*   GLUCOSE mg/dL 103* 110*   CALCIUM mg/dL 9.2 9.7   WBC 10*3/mm3 6.80 11.20*   HEMOGLOBIN g/dL 11.3* 12.6   PLATELETS 10*3/mm3 348 361   ALT (SGPT) U/L  --  16   AST (SGOT) U/L  --  16     Lab Results   Component Value Date    CKTOTAL 64 11/17/2022    CKMB <1.00 11/17/2022     Estimated Creatinine Clearance: 32.7 mL/min (A) (by C-G formula based on SCr of 1.48 mg/dL (H)).No results found for: URICACID    Brief Urine Lab Results     None          Prior to Admission medications    Medication Sig Start Date End Date Taking? Authorizing Provider   HYDROcodone-acetaminophen (NORCO) 5-325 MG per tablet Take 1 tablet by mouth Every 6 (Six) Hours As Needed for Moderate Pain. 11/2/22   Jeremiah Sanders MD       ceFAZolin, 2 g, Intravenous, Once  senna-docusate sodium, 2 tablet, Oral, BID  sodium chloride, 10 mL, Intravenous, Q12H      sodium chloride, 75 mL/hr, Last Rate: 75 mL/hr (11/18/22 0836)        Assessment & Plan       1. Acute kidney injury.  Most likely prerenal due to dehydration and hypotension.  Creatinine 1.4 at MD/DL this morning.  Electrolyte okay.  Looks dry clinically.  Her blood pressure has been running low since last night.  Patient is nonoliguric.  She is receiving gentle IV hydration  2.  Right trimalleolar ankle fracture    Plan:  Ordered fluid bolus and continue Maintenance IV fluids  Order urinalysis and urine sodium  Avoid nephrotoxins  Hopefully creatinine start improving soon  Monitor CK level      I discussed the patients findings and my recommendations with patient and nursing staff    Ramin Pickard MD  11/18/22  11:27 EST

## 2022-11-18 NOTE — ED PROVIDER NOTES
"Subjective   History of Present Illness  68 y/o white female with a PMHx of stroke with R-sided residual effects presents to the ED via EMS with cc of R foot pain onset 2 hours PTA. Pt states she was using the restroom, went to stand up, lost her balance and fell to the ground landing on her right side. Unable to get up from fall, family found her 2 hours later and called EMS. Denies hitting her head or any LOC. She is unsure of the mechanism of the fall but reports pain to the lateral heal. Describes pain as a throbbing sensation and rates it as an 8 out of 10. Denies feeling weak or dizzy prior to fall, R hip pain, and R knee pain. Pt states this is her 2 fall in the last 30 days. 11/2/2022 she fell out of bed and obtained a proximal R humeral neck fracture. States she will call to schedule follow up with ortho next week for humerus fracture. Denies taking any pain medications at this time as \"she is out of them.\"         Review of Systems   Constitutional: Negative for chills, fatigue and fever.   HENT: Negative for congestion, tinnitus and trouble swallowing.    Eyes: Negative for photophobia, discharge and redness.   Respiratory: Negative for cough and shortness of breath.    Cardiovascular: Negative for chest pain and palpitations.   Gastrointestinal: Negative for abdominal pain, diarrhea, nausea and vomiting.   Genitourinary: Negative for dysuria, frequency and urgency.   Musculoskeletal: Negative for back pain, joint swelling and myalgias.        Right foot and ankle pain   Skin: Negative for rash.   Neurological: Positive for weakness. Negative for dizziness, syncope, light-headedness and headaches.   Psychiatric/Behavioral: Negative for confusion.   All other systems reviewed and are negative.      History reviewed. No pertinent past medical history.    No Known Allergies    History reviewed. No pertinent surgical history.    History reviewed. No pertinent family history.    Social History "     Socioeconomic History   • Marital status:            Objective   Physical Exam  Vitals reviewed.   Constitutional:       General: She is not in acute distress.     Appearance: Normal appearance. She is well-developed and normal weight. She is not toxic-appearing.   HENT:      Head: Normocephalic and atraumatic.      Right Ear: External ear normal.      Left Ear: External ear normal.      Nose: Nose normal.      Mouth/Throat:      Mouth: Mucous membranes are moist.   Eyes:      Conjunctiva/sclera: Conjunctivae normal.      Pupils: Pupils are equal, round, and reactive to light.   Cardiovascular:      Rate and Rhythm: Normal rate and regular rhythm.      Pulses:           Dorsalis pedis pulses are 1+ on the right side and 1+ on the left side.        Posterior tibial pulses are 1+ on the right side and 1+ on the left side.      Heart sounds: Normal heart sounds.   Pulmonary:      Effort: Pulmonary effort is normal. No respiratory distress.      Breath sounds: Normal breath sounds. No wheezing.   Abdominal:      General: Bowel sounds are normal. There is no distension.      Palpations: Abdomen is soft. There is no mass.      Tenderness: There is no abdominal tenderness. There is no guarding or rebound.   Musculoskeletal:         General: No deformity. Normal range of motion.      Cervical back: Normal range of motion and neck supple.   Feet:      Right foot:      Skin integrity: Skin integrity normal.      Left foot:      Skin integrity: Skin integrity normal.   Skin:     General: Skin is warm and dry.      Capillary Refill: Capillary refill takes less than 2 seconds.   Neurological:      General: No focal deficit present.      Mental Status: She is alert and oriented to person, place, and time.      GCS: GCS eye subscore is 4. GCS verbal subscore is 5. GCS motor subscore is 6.      Cranial Nerves: No cranial nerve deficit.      Sensory: No sensory deficit.      Deep Tendon Reflexes: Reflexes normal.  "  Psychiatric:         Mood and Affect: Mood normal.         Behavior: Behavior normal.         Procedures       Posterior splint and sitrrup plaster splint placed on the right lower extremity.and the patient was distally NVS intact.     ED Course  ED Course as of 11/17/22 2153   Thu Nov 17, 2022 2115 Patient was discussed with Dr. Marie who states he will not be able to perform the surgery until late tomorrow afternoon and the patient does not need to be n.p.o. after midnight at this time [KW]      ED Course User Index  [KW] Echo Carver, APRN      /81 (BP Location: Right arm, Patient Position: Lying)   Pulse 85   Temp 98.6 °F (37 °C) (Oral)   Resp 20   Ht 152.4 cm (60\")   Wt 71.7 kg (158 lb)   SpO2 98%   BMI 30.86 kg/m²   Labs Reviewed   COMPREHENSIVE METABOLIC PANEL - Abnormal; Notable for the following components:       Result Value    Glucose 110 (*)     Creatinine 1.06 (*)     Alkaline Phosphatase 174 (*)     eGFR 57.3 (*)     All other components within normal limits    Narrative:     GFR Normal >60  Chronic Kidney Disease <60  Kidney Failure <15     CBC WITH AUTO DIFFERENTIAL - Abnormal; Notable for the following components:    WBC 11.20 (*)     Neutrophil % 88.4 (*)     Lymphocyte % 6.1 (*)     Monocyte % 4.9 (*)     Neutrophils, Absolute 9.90 (*)     All other components within normal limits   CK TOTAL AND CKMB - Normal    Narrative:     CKMB results may be falsely decreased if patient taking Biotin.   CBC AND DIFFERENTIAL    Narrative:     The following orders were created for panel order CBC & Differential.  Procedure                               Abnormality         Status                     ---------                               -----------         ------                     CBC Auto Differential[[id number]]        Abnormal            Final result                 Please view results for these tests on the individual orders.     Medications   sodium chloride 0.9 % flush 10 mL " (has no administration in time range)   morphine injection 4 mg (4 mg Intravenous Given 11/17/22 2046)   ondansetron (ZOFRAN) injection 4 mg (4 mg Intravenous Given 11/17/22 2046)                                          MDM  Number of Diagnoses or Management Options  Acute pain of right foot  Acute right ankle pain  Fall, initial encounter  Diagnosis management comments: Patient had IV established and blood work was obtained.  Patient was given morphine and Zofran for pain control.        Amount and/or Complexity of Data Reviewed  Clinical lab tests: reviewed    Risk of Complications, Morbidity, and/or Mortality  Presenting problems: high  Diagnostic procedures: high  Management options: high    Patient Progress  Patient progress: stable      Final diagnoses:   Fall, initial encounter   Acute right ankle pain   Acute pain of right foot       ED Disposition  ED Disposition     ED Disposition   Decision to Admit    Condition   --    Comment   Level of Care: Med/Surg [1]   Admitting Physician: CARL SIMS [[id number]]   Attending Physician: CARL SIMS [[id number]]               No follow-up provider specified.       Medication List      No changes were made to your prescriptions during this visit.          Echo Carver, APRN  11/19/22 2033

## 2022-11-19 LAB
ALBUMIN SERPL-MCNC: 3.5 G/DL (ref 3.5–5.2)
ANION GAP SERPL CALCULATED.3IONS-SCNC: 17 MMOL/L (ref 5–15)
BACTERIA UR QL AUTO: ABNORMAL /HPF
BASOPHILS # BLD AUTO: 0 10*3/MM3 (ref 0–0.2)
BASOPHILS NFR BLD AUTO: 0.2 % (ref 0–1.5)
BILIRUB UR QL STRIP: NEGATIVE
BUN SERPL-MCNC: 19 MG/DL (ref 8–23)
BUN/CREAT SERPL: 11.9 (ref 7–25)
CALCIUM SPEC-SCNC: 8.6 MG/DL (ref 8.6–10.5)
CHLORIDE SERPL-SCNC: 105 MMOL/L (ref 98–107)
CLARITY UR: ABNORMAL
CO2 SERPL-SCNC: 16 MMOL/L (ref 22–29)
COLOR UR: YELLOW
CREAT SERPL-MCNC: 1.59 MG/DL (ref 0.57–1)
DEPRECATED RDW RBC AUTO: 52.1 FL (ref 37–54)
EGFRCR SERPLBLD CKD-EPI 2021: 35.2 ML/MIN/1.73
EOSINOPHIL # BLD AUTO: 0 10*3/MM3 (ref 0–0.4)
EOSINOPHIL NFR BLD AUTO: 0 % (ref 0.3–6.2)
ERYTHROCYTE [DISTWIDTH] IN BLOOD BY AUTOMATED COUNT: 15.7 % (ref 12.3–15.4)
GLUCOSE SERPL-MCNC: 122 MG/DL (ref 65–99)
GLUCOSE UR STRIP-MCNC: NEGATIVE MG/DL
HCT VFR BLD AUTO: 35.6 % (ref 34–46.6)
HGB BLD-MCNC: 11 G/DL (ref 12–15.9)
HGB UR QL STRIP.AUTO: NEGATIVE
HYALINE CASTS UR QL AUTO: ABNORMAL /LPF
KETONES UR QL STRIP: ABNORMAL
LEUKOCYTE ESTERASE UR QL STRIP.AUTO: ABNORMAL
LYMPHOCYTES # BLD AUTO: 0.4 10*3/MM3 (ref 0.7–3.1)
LYMPHOCYTES NFR BLD AUTO: 6.7 % (ref 19.6–45.3)
MCH RBC QN AUTO: 28.5 PG (ref 26.6–33)
MCHC RBC AUTO-ENTMCNC: 30.9 G/DL (ref 31.5–35.7)
MCV RBC AUTO: 92.2 FL (ref 79–97)
MONOCYTES # BLD AUTO: 0.2 10*3/MM3 (ref 0.1–0.9)
MONOCYTES NFR BLD AUTO: 2.4 % (ref 5–12)
NEUTROPHILS NFR BLD AUTO: 5.7 10*3/MM3 (ref 1.7–7)
NEUTROPHILS NFR BLD AUTO: 90.7 % (ref 42.7–76)
NITRITE UR QL STRIP: NEGATIVE
NRBC BLD AUTO-RTO: 0 /100 WBC (ref 0–0.2)
PH UR STRIP.AUTO: <=5 [PH] (ref 5–8)
PHOSPHATE SERPL-MCNC: 3.5 MG/DL (ref 2.5–4.5)
PLATELET # BLD AUTO: 180 10*3/MM3 (ref 140–450)
PMV BLD AUTO: 9.4 FL (ref 6–12)
POTASSIUM SERPL-SCNC: 4.5 MMOL/L (ref 3.5–5.2)
PROT UR QL STRIP: ABNORMAL
RBC # BLD AUTO: 3.86 10*6/MM3 (ref 3.77–5.28)
RBC # UR STRIP: ABNORMAL /HPF
REF LAB TEST METHOD: ABNORMAL
SODIUM SERPL-SCNC: 138 MMOL/L (ref 136–145)
SODIUM UR-SCNC: 52 MMOL/L
SP GR UR STRIP: 1.02 (ref 1–1.03)
SQUAMOUS #/AREA URNS HPF: ABNORMAL /HPF
STARCH GRANULES URNS QL MICRO: ABNORMAL /HPF
UROBILINOGEN UR QL STRIP: ABNORMAL
WBC # UR STRIP: ABNORMAL /HPF
WBC NRBC COR # BLD: 6.3 10*3/MM3 (ref 3.4–10.8)

## 2022-11-19 PROCEDURE — 80069 RENAL FUNCTION PANEL: CPT | Performed by: ORTHOPAEDIC SURGERY

## 2022-11-19 PROCEDURE — 25010000002 CEFAZOLIN PER 500 MG: Performed by: ORTHOPAEDIC SURGERY

## 2022-11-19 PROCEDURE — 97166 OT EVAL MOD COMPLEX 45 MIN: CPT

## 2022-11-19 PROCEDURE — 84300 ASSAY OF URINE SODIUM: CPT | Performed by: ORTHOPAEDIC SURGERY

## 2022-11-19 PROCEDURE — 81001 URINALYSIS AUTO W/SCOPE: CPT | Performed by: ORTHOPAEDIC SURGERY

## 2022-11-19 PROCEDURE — 85025 COMPLETE CBC W/AUTO DIFF WBC: CPT | Performed by: ORTHOPAEDIC SURGERY

## 2022-11-19 PROCEDURE — 97162 PT EVAL MOD COMPLEX 30 MIN: CPT

## 2022-11-19 PROCEDURE — 83036 HEMOGLOBIN GLYCOSYLATED A1C: CPT | Performed by: HOSPITALIST

## 2022-11-19 PROCEDURE — G0378 HOSPITAL OBSERVATION PER HR: HCPCS

## 2022-11-19 RX ADMIN — ASPIRIN 325 MG ORAL TABLET 325 MG: 325 PILL ORAL at 09:01

## 2022-11-19 RX ADMIN — CEFAZOLIN 2 G: 2 INJECTION, POWDER, FOR SOLUTION INTRAMUSCULAR; INTRAVENOUS at 02:11

## 2022-11-19 RX ADMIN — CEFAZOLIN 2 G: 2 INJECTION, POWDER, FOR SOLUTION INTRAMUSCULAR; INTRAVENOUS at 09:01

## 2022-11-19 RX ADMIN — OXYCODONE 5 MG: 5 TABLET ORAL at 10:16

## 2022-11-19 RX ADMIN — Medication 10 ML: at 20:15

## 2022-11-19 RX ADMIN — POLYETHYLENE GLYCOL 3350 17 G: 17 POWDER, FOR SOLUTION ORAL at 09:01

## 2022-11-19 RX ADMIN — Medication 10 ML: at 08:59

## 2022-11-19 RX ADMIN — SENNOSIDES AND DOCUSATE SODIUM 2 TABLET: 50; 8.6 TABLET ORAL at 09:01

## 2022-11-19 RX ADMIN — OXYCODONE 5 MG: 5 TABLET ORAL at 17:26

## 2022-11-19 RX ADMIN — SENNOSIDES AND DOCUSATE SODIUM 2 TABLET: 50; 8.6 TABLET ORAL at 20:15

## 2022-11-19 NOTE — PLAN OF CARE
Goal Outcome Evaluation:  Plan of Care Reviewed With: patient        Progress: improving  Outcome Evaluation: Pt is s/p ORIF this evening to the right ankle.  Patient states pain is much better although that could just be r/t residual of surgical sedation.  Will continue with current orders care plans and monitoring.

## 2022-11-19 NOTE — ANESTHESIA POSTPROCEDURE EVALUATION
Patient: Kathe Burt    Procedure Summary     Date: 11/18/22 Room / Location: [facility] OR 09 / [facility] MAIN OR    Anesthesia Start: 1750 Anesthesia Stop: 1918    Procedure: ANKLE OPEN REDUCTION INTERNAL FIXATION (Right: Ankle) Diagnosis:     Surgeons: Taran Marie MD Provider: Deondre Nino MD    Anesthesia Type: general with block ASA Status: 3          Anesthesia Type: general with block    Vitals  Vitals Value Taken Time   /71 11/18/22 1955   Temp 98.7 °F (37.1 °C) 11/18/22 1918   Pulse 93 11/18/22 1959   Resp 17 11/18/22 1950   SpO2 91 % 11/18/22 1959   Vitals shown include unvalidated device data.        Post Anesthesia Care and Evaluation    Patient location during evaluation: PACU  Patient participation: complete - patient participated  Level of consciousness: awake  Pain management: adequate    Airway patency: patent  Anesthetic complications: No anesthetic complications  PONV Status: none  Cardiovascular status: acceptable  Respiratory status: acceptable  Hydration status: acceptable    Comments: Patient seen and examined postoperatively; vital signs stable; SpO2 greater than or equal to 90%; cardiopulmonary status stable; nausea/vomiting adequately controlled; pain adequately controlled; no apparent anesthesia complications; patient discharged from anesthesia care when discharge criteria were met

## 2022-11-19 NOTE — PROGRESS NOTES
LOS: 1 day   Patient Care Team:  Gavi Calvert APRN as PCP - General (Nurse Practitioner)        Subjective     Postop day 1 from ORIF right trimalleolar ankle fracture, much more comfortable      Objective     Vital Signs  Vitals:    11/18/22 2020 11/18/22 2032 11/19/22 0007 11/19/22 0413   BP: 128/75 121/79 121/75 128/79   BP Location:  Left arm Left arm Left arm   Patient Position:  Lying Lying Lying   Pulse: 89 89 93 95   Resp: 14 15 14 16   Temp: 97.4 °F (36.3 °C) 97.6 °F (36.4 °C) 97.6 °F (36.4 °C) 97.6 °F (36.4 °C)   TempSrc:  Oral Oral Oral   SpO2: 95% 97% 95% 95%   Weight:       Height:           Physical Exam:   Alert and orient x3  Poor toe motion but says has good sensation     Results Review:     I reviewed the patient's new clinical results.  I reviewed the patient's new imaging results    Lab Results (last 24 hours)     Procedure Component Value Units Date/Time    Renal Function Panel [[id number]]  (Abnormal) Collected: 11/19/22 0239    Specimen: Blood Updated: 11/19/22 0415     Glucose 122 mg/dL      BUN 19 mg/dL      Creatinine 1.59 mg/dL      Sodium 138 mmol/L      Potassium 4.5 mmol/L      Comment: Slight hemolysis detected by analyzer. Results may be affected.        Chloride 105 mmol/L      CO2 16.0 mmol/L      Calcium 8.6 mg/dL      Albumin 3.50 g/dL      Phosphorus 3.5 mg/dL      Anion Gap 17.0 mmol/L      BUN/Creatinine Ratio 11.9     eGFR 35.2 mL/min/1.73      Comment: National Kidney Foundation and American Society of Nephrology (ASN) Task Force recommended calculation based on the Chronic Kidney Disease Epidemiology Collaboration (CKD-EPI) equation refit without adjustment for race.       Narrative:      GFR Normal >60  Chronic Kidney Disease <60  Kidney Failure <15      CBC & Differential [[id number]]  (Abnormal) Collected: 11/19/22 0239    Specimen: Blood Updated: 11/19/22 0342    Narrative:      The following orders were created for panel order CBC & Differential.  Procedure                                Abnormality         Status                     ---------                               -----------         ------                     CBC Auto Differential[[id number]]        Abnormal            Final result                 Please view results for these tests on the individual orders.    CBC Auto Differential [[id number]]  (Abnormal) Collected: 11/19/22 0239    Specimen: Blood Updated: 11/19/22 0342     WBC 6.30 10*3/mm3      RBC 3.86 10*6/mm3      Hemoglobin 11.0 g/dL      Hematocrit 35.6 %      MCV 92.2 fL      MCH 28.5 pg      MCHC 30.9 g/dL      RDW 15.7 %      RDW-SD 52.1 fl      MPV 9.4 fL      Platelets 180 10*3/mm3      Neutrophil % 90.7 %      Lymphocyte % 6.7 %      Monocyte % 2.4 %      Eosinophil % 0.0 %      Basophil % 0.2 %      Neutrophils, Absolute 5.70 10*3/mm3      Lymphocytes, Absolute 0.40 10*3/mm3      Monocytes, Absolute 0.20 10*3/mm3      Eosinophils, Absolute 0.00 10*3/mm3      Basophils, Absolute 0.00 10*3/mm3      nRBC 0.0 /100 WBC     Urinalysis, Microscopic Only - Urine, Clean Catch [[id number]]  (Abnormal) Collected: 11/19/22 0100    Specimen: Urine, Clean Catch Updated: 11/19/22 0202     RBC, UA 3-5 /HPF      WBC, UA Too Numerous to Count /HPF      Bacteria, UA 4+ /HPF      Squamous Epithelial Cells, UA 13-20 /HPF      Hyaline Casts, UA None Seen /LPF      Starch, UA Small/1+ /HPF      Methodology Manual Light Microscopy    Sodium, Urine, Random - Urine, Clean Catch [[id number]] Collected: 11/19/22 0100    Specimen: Urine, Clean Catch Updated: 11/19/22 0155     Sodium, Urine 52 mmol/L     Narrative:      Reference intervals for random urine have not been established.  Clinical usage is dependent upon physician's interpretation in combination with other laboratory tests.       Urinalysis With Microscopic If Indicated (No Culture) - Urine, Clean Catch [[id number]]  (Abnormal) Collected: 11/19/22 0100    Specimen: Urine, Clean Catch Updated: 11/19/22 0148     Color, UA  Yellow     Appearance, UA Cloudy     pH, UA <=5.0     Specific Gravity, UA 1.020     Glucose, UA Negative     Ketones, UA Trace     Bilirubin, UA Negative     Blood, UA Negative     Protein, UA 30 mg/dL (1+)     Leuk Esterase, UA Large (3+)     Nitrite, UA Negative     Urobilinogen, UA 0.2 E.U./dL        Imaging Results (Last 24 Hours)     Procedure Component Value Units Date/Time    FL C Arm During Surgery [[id number]] Resulted: 11/18/22 2134     Updated: 11/18/22 2134    Narrative:      This procedure was auto-finalized with no dictation required.    XR Ankle 2 View Right [[id number]] Resulted: 11/18/22 2131     Updated: 11/18/22 2132            Medication Review:   Scheduled Meds:aspirin, 325 mg, Oral, Daily  ceFAZolin, 2 g, Intravenous, Q8H  polyethylene glycol, 17 g, Oral, Daily  senna-docusate sodium, 2 tablet, Oral, BID  sodium chloride, 10 mL, Intravenous, Q12H      Continuous Infusions:sodium chloride, 75 mL/hr, Last Rate: 75 mL/hr (11/19/22 0517)      PRN Meds:.•  acetaminophen  •  senna-docusate sodium **AND** polyethylene glycol **AND** bisacodyl **AND** bisacodyl  •  HYDROcodone-acetaminophen  •  melatonin  •  Morphine **AND** naloxone  •  ondansetron **OR** ondansetron  •  oxyCODONE  •  oxyCODONE  •  sodium chloride  •  [COMPLETED] Insert Peripheral IV **AND** sodium chloride  •  sodium chloride  •  sodium chloride      Assessment & Plan     Improved    Plan for disposition: Toe-touch weight-bear for 6 weeks.  Likely need rehab placement    Taran Marie MD  11/19/22  08:52 EST

## 2022-11-19 NOTE — PLAN OF CARE
"Goal Outcome Evaluation:  Plan of Care Reviewed With: patient, spouse        Progress: improving  Outcome Evaluation: Pt is 68 y/o F with Hx CVA 18 years ago with residual rt hemiparesis and cognitive deficits. She fell a week ago at home and sustained a Rt humeral Fx. She then fell again fracturing her Rt ankle. She is s/p ORIF to the rt ankle and is TTWB RLE. Her rt arm is in a sling but assume pendulums would be appropriate for a non-surgical humeral fx.  Pt reports she is normally (I) for ADL and w/c transfers but is a limited historian as is her spouse. Pt appears to have very limited use of her hemiparetic side, though she reports she does normally bear weight on her Rt leg during transfers, and she states she does use her rt arm for some support on the RW when making transfers. Again, Pt is a limited historian and does demonstrate confusion, but regardless she does appear to be functioning well below her baseline and spouse does leave her alone regularly during the day and states he is unable to assist with transfers due to knee problems. Pt required mod (A) of 2 staff this date to pivot to the chair. Her ADL skill is severely impaired as well. They have 6 steps to access their home and spouse states it is \"very difficult\" to manage this with a wheelchair. Recommend SNF for rehab at d/c.  "

## 2022-11-19 NOTE — PLAN OF CARE
Goal Outcome Evaluation:  Plan of Care Reviewed With: patient        Progress: improving  Outcome Evaluation: Patient 66 yo female admitted to the hospital with acute R ankle pain following fall. X Ray ankle shows Comminuted trimalleolar fracture dislocation of the ankle. Patient underwent Open reduction and fixation right trimalleolar ankle fracture without posterior fixation on 11/18/22. Patient is allowed to do TTWB. At baseline, patient resides with spouse at home. Patient had R sided stroke. As per patient, Patient uses walker for w/c transfer. Spouse assist with ADLs except driving. Neighbor provides transportation when needed. As per today's evaluation,and required Min A x2 for Bed Mob and Mod Ax2 for sit to stand and pivot transfer to chair. PT recommends SNF to return to her PLOF. PT will follow while her stay at [facility].

## 2022-11-19 NOTE — THERAPY EVALUATION
Patient Name: Kathe Burt  : 1955    MRN: [id number]                              Today's Date: 2022       Admit Date: 2022    Visit Dx:     ICD-10-CM ICD-9-CM   1. Fall, initial encounter  W19.XXXA E888.9   2. Acute right ankle pain  M25.571 719.47     338.19   3. Acute pain of right foot  M79.671 729.5     Patient Active Problem List   Diagnosis   • Fall, initial encounter   • History of CVA (cerebrovascular accident)   • Right sided weakness due to previous CVA   • Trimalleolar fracture of ankle, closed, right, initial encounter     Past Medical History:   Diagnosis Date   • Stroke (HCC)      History reviewed. No pertinent surgical history.   General Information     Row Name 22 1156          General Information    Prior Level of Function independent:;transfer;w/c or scooter;ADL's  Pt & spouse were poor historians. Pt reported she uses RW to make pivot transfers to w/c or toilet and takes baths.  -     Existing Precautions/Restrictions fall;weight bearing  Pt reports she was NWB prior to her fall & sx on her RLE but may have been confused and trying to say she was just not able due to weakness from her stroke to bear much weight on her leg.  -     Barriers to Rehab medically complex;previous functional deficit;cognitive status  -     Row Name 22 1156          Living Environment    People in Home spouse  -     Row Name 22 1156          Home Main Entrance    Number of Stairs, Main Entrance six  -     Row Name 22 1156          Stairs Within Home, Primary    Number of Stairs, Within Home, Primary none  -     Row Name 22 1156          Cognition    Orientation Status (Cognition) oriented x 4;verbal cues/prompts needed for orientation  grossly oriented but with confusion, nonetheless.  -     Row Name 22 1156          Safety Issues, Functional Mobility    Safety Issues Affecting Function (Mobility) judgment;problem-solving;safety precaution  awareness;sequencing abilities;awareness of need for assistance;insight into deficits/self-awareness  -     Impairments Affecting Function (Mobility) balance;cognition;coordination;endurance/activity tolerance;grasp;muscle tone abnormal;range of motion (ROM)  -           User Key  (r) = Recorded By, (t) = Taken By, (c) = Cosigned By    Initials Name Provider Type     Ana Rapp OT Occupational Therapist                 Mobility/ADL's     Row Name 11/19/22 1210          Bed Mobility    Bed Mobility supine-sit  -     Supine-Sit [place] (Bed Mobility) 2 person assist;minimum assist (75% patient effort)  -     Bed Mobility, Safety Issues cognitive deficits limit understanding;decreased use of arms for pushing/pulling;decreased use of legs for bridging/pushing  -     Assistive Device (Bed Mobility) head of bed elevated;bed rails  -[facility] Name 11/19/22 1210          Transfers    Transfers sit-stand transfer;bed-chair transfer  -[facility] Name 11/19/22 1210          Bed-Chair Transfer    Bed-Chair [place] (Transfers) 2 person assist;moderate assist (50% patient effort)  -[facility] Name 11/19/22 1210          Sit-Stand Transfer    Sit-Stand [place] (Transfers) 2 person assist;moderate assist (50% patient effort)  -     Assistive Device (Sit-Stand Transfers) walker, front-wheeled  -     Row Name 11/19/22 1210          Activities of Daily Living    BADL Assessment/Intervention lower body dressing;toileting;upper body dressing  -     Row Name 11/19/22 1210          Lower Body Dressing Assessment/Training    [place] Level (Lower Body Dressing) don;socks;dependent (less than 25% patient effort)  -     Row Name 11/19/22 1210          Toileting Assessment/Training    [place] Level (Toileting) toileting skills;dependent (less than 25% patient effort)  -[facility] Name 11/19/22 1210          Upper Body Dressing Assessment/Training    [place] Level (Upper Body Dressing)  upper body dressing skills;maximum assist (25% patient effort)  -     Position (Upper Body Dressing) edge of bed sitting  -           User Key  (r) = Recorded By, (t) = Taken By, (c) = Cosigned By    Initials Name Provider Type    Ana Coello OT Occupational Therapist               Obj/Interventions     Row Name 11/19/22 1212          Range of Motion Comprehensive    Comment, General Range of Motion rt shld flex 25% passively with pendulum; rt hand laxity coupled with mild flexion contractures.  -     Row Name 11/19/22 1212          Strength Comprehensive (MMT)    Comment, General Manual Muscle Testing (MMT) Assessment RUE 1/5; LUE 4/5; LLE 3+/5  -     Row Name 11/19/22 1212          Balance    Balance Assessment sitting static balance;sitting dynamic balance;standing static balance;standing dynamic balance  -     Static Sitting Balance supervision  -     Dynamic Sitting Balance contact guard  -     Position, Sitting Balance sitting edge of bed;unsupported  -     Static Standing Balance 2-person assist;moderate assist  -     Dynamic Standing Balance 2-person assist;moderate assist  -           User Key  (r) = Recorded By, (t) = Taken By, (c) = Cosigned By    Initials Name Provider Type    Ana Coello OT Occupational Therapist               Goals/Plan     Row Name 11/19/22 1240          Bed Mobility Goal 1 (OT)    Activity/Assistive Device (Bed Mobility Goal 1, OT) bed mobility activities, all  -     [place] Level/Cues Needed (Bed Mobility Goal 1, OT) minimum assist (75% or more patient effort)  -     Time Frame (Bed Mobility Goal 1, OT) 2 weeks  -     Row Name 11/19/22 1240          Transfer Goal 1 (OT)    Activity/Assistive Device (Transfer Goal 1, OT) bed-to-chair/chair-to-bed;commode, bedside without drop arms;sit-to-stand/stand-to-sit  -     [place] Level/Cues Needed (Transfer Goal 1, OT) moderate assist (50-74% patient effort)  -     Time Frame (Transfer  Goal 1, OT) 2 weeks  -     Row Name 11/19/22 1240          Toileting Goal 1 (OT)    Activity/Device (Toileting Goal 1, OT) adjust/manage clothing;perform perineal hygiene;commode, bedside without drop arms  -     [place] Level/Cues Needed (Toileting Goal 1, OT) moderate assist (50-74% patient effort)  -     Time Frame (Toileting Goal 1, OT) 2 weeks  -     Row Name 11/19/22 1240          Grooming Goal 1 (OT)    Activity/Device (Grooming Goal 1, OT) grooming skills, all  -     [place] (Grooming Goal 1, OT) set-up required;standby assist  -     Time Frame (Grooming Goal 1, OT) 2 weeks  -     Row Name 11/19/22 1248          Therapy Assessment/Plan (OT)    Planned Therapy Interventions (OT) activity tolerance training;adaptive equipment training;BADL retraining;cognitive/visual perception retraining;functional balance retraining;occupation/activity based interventions;patient/caregiver education/training;transfer/mobility retraining;ROM/therapeutic exercise;edema control/reduction;passive ROM/stretching  -           User Key  (r) = Recorded By, (t) = Taken By, (c) = Cosigned By    Initials Name Provider Type     Ana Rapp, YUNG Occupational Therapist               Clinical Impression     Row Name 11/19/22 1213          Pain Assessment    Pretreatment Pain Rating 6/10  -     Posttreatment Pain Rating 3/10  -     Pain Location - Side/Orientation --  rt shld from fall 1 week ago & rt foot from Sx yest.  -     Row Name 11/19/22 6384          Plan of Care Review    Plan of Care Reviewed With patient;spouse  -     Progress improving  -     Outcome Evaluation Pt is 66 y/o F with Hx CVA 18 years ago with residual rt hemiparesis and cognitive deficits. She fell a week ago at home and sustained a Rt humeral Fx. She then fell again fracturing her Rt ankle. She is s/p ORIF to the rt ankle and is TTWB RLE. Her rt arm is in a sling but assume pendulums would be appropriate for a non-surgical  "humeral fx.  Pt reports she is normally (I) for ADL and w/c transfers but is a limited historian as is her spouse. Pt appears to have very limited use of her hemiparetic side, though she reports she does normally bear weight on her Rt leg during transfers, and she states she does use her rt arm for some support on the RW when making transfers. Again, Pt is a limited historian and does demonstrate confusion, but regardless she does appear to be functioning well below her baseline and spouse does leave her alone regularly during the day and states he is unable to assist with transfers due to knee problems. Pt required mod (A) of 2 staff this date to pivot to the chair. Her ADL skill is severely impaired as well. They have 6 steps to access their home and spouse states it is \"very difficult\" to manage this with a wheelchair. Recommend SNF for rehab at d/c.  -     Row Name 11/19/22 1214          Therapy Assessment/Plan (OT)    Rehab Potential (OT) good, to achieve stated therapy goals  -     Criteria for Skilled Therapeutic Interventions Met (OT) skilled treatment is necessary  -     Therapy Frequency (OT) 3 times/wk  -     Predicted Duration of Therapy Intervention (OT) until d/c  -     Row Name 11/19/22 1214          Therapy Plan Review/Discharge Plan (OT)    Anticipated Discharge Disposition (OT) skilled nursing facility  -     Row Name 11/19/22 1214          Vital Signs    Pre Patient Position Supine  -     Intra Patient Position Standing  -     Post Patient Position Sitting  -     Row Name 11/19/22 1214          Positioning and Restraints    Pre-Treatment Position in bed  -     Post Treatment Position chair  -     In Chair notified nsg;reclined;call light within reach;encouraged to call for assist;exit alarm on;RLE elevated  -           User Key  (r) = Recorded By, (t) = Taken By, (c) = Cosigned By    Initials Name Provider Type    Ana Coello, OT Occupational Therapist               " Outcome Measures     Row Name 11/19/22 1248          How much help from another is currently needed...    Putting on and taking off regular lower body clothing? 1  -MH     Bathing (including washing, rinsing, and drying) 2  -MH     Toileting (which includes using toilet bed pan or urinal) 1  -MH     Putting on and taking off regular upper body clothing 2  -MH     Taking care of personal grooming (such as brushing teeth) 2  -MH     Eating meals 3  -     AM-PAC 6 Clicks Score (OT) 11  -     Row Name 11/19/22 0851          How much help from another person do you currently need...    Turning from your back to your side while in flat bed without using bedrails? 3  -KI     Moving from lying on back to sitting on the side of a flat bed without bedrails? 2  -KI     Moving to and from a bed to a chair (including a wheelchair)? 2  -KI     Standing up from a chair using your arms (e.g., wheelchair, bedside chair)? 1  -KI     Climbing 3-5 steps with a railing? 1  -KI     To walk in hospital room? 1  -KI     AM-PAC 6 Clicks Score (PT) 10  -KI     Highest level of mobility 4 --> Transferred to chair/commode  -KI     Row Name 11/19/22 1248          Functional Assessment    Outcome Measure Options AM-PAC 6 Clicks Daily Activity (OT)  -           User Key  (r) = Recorded By, (t) = Taken By, (c) = Cosigned By    Initials Name Provider Type     Ana Rapp OT Occupational Therapist    Sarita Jimenez RN Registered Nurse                Occupational Therapy Education     Title: PT OT SLP Therapies (In Progress)     Topic: Occupational Therapy (In Progress)     Point: ADL training (Not Started)     Description:   Instruct learner(s) on proper safety adaptation and remediation techniques during self care or transfers.   Instruct in proper use of assistive devices.              Learner Progress:  Not documented in this visit.          Point: Home exercise program (Done)     Description:   Instruct learner(s) on  appropriate technique for monitoring, assisting and/or progressing therapeutic exercises/activities.              Learning Progress Summary           Patient Acceptance, E,TB, VU,NR by  at 11/19/2022 1249   Significant Other Acceptance, E,TB, VU,NR by  at 11/19/2022 1249                   Point: Precautions (Done)     Description:   Instruct learner(s) on prescribed precautions during self-care and functional transfers.              Learning Progress Summary           Patient Acceptance, E,TB, VU,NR by  at 11/19/2022 1249   Significant Other Acceptance, E,TB, VU,NR by  at 11/19/2022 1249                   Point: Body mechanics (Done)     Description:   Instruct learner(s) on proper positioning and spine alignment during self-care, functional mobility activities and/or exercises.              Learning Progress Summary           Patient Acceptance, E,TB, VU,NR by  at 11/19/2022 1249   Significant Other Acceptance, E,TB, VU,NR by  at 11/19/2022 1249                               User Key     Initials Effective Dates Name Provider Type Discipline     06/16/21 -  Ana Rapp OT Occupational Therapist OT              OT Recommendation and Plan  Planned Therapy Interventions (OT): activity tolerance training, adaptive equipment training, BADL retraining, cognitive/visual perception retraining, functional balance retraining, occupation/activity based interventions, patient/caregiver education/training, transfer/mobility retraining, ROM/therapeutic exercise, edema control/reduction, passive ROM/stretching  Therapy Frequency (OT): 3 times/wk  Plan of Care Review  Plan of Care Reviewed With: patient, spouse  Progress: improving  Outcome Evaluation: Pt is 68 y/o F with Hx CVA 18 years ago with residual rt hemiparesis and cognitive deficits. She fell a week ago at home and sustained a Rt humeral Fx. She then fell again fracturing her Rt ankle. She is s/p ORIF to the rt ankle and is TTWB RLE. Her rt arm is in a  "sling but assume pendulums would be appropriate for a non-surgical humeral fx.  Pt reports she is normally (I) for ADL and w/c transfers but is a limited historian as is her spouse. Pt appears to have very limited use of her hemiparetic side, though she reports she does normally bear weight on her Rt leg during transfers, and she states she does use her rt arm for some support on the RW when making transfers. Again, Pt is a limited historian and does demonstrate confusion, but regardless she does appear to be functioning well below her baseline and spouse does leave her alone regularly during the day and states he is unable to assist with transfers due to knee problems. Pt required mod (A) of 2 staff this date to pivot to the chair. Her ADL skill is severely impaired as well. They have 6 steps to access their home and spouse states it is \"very difficult\" to manage this with a wheelchair. Recommend SNF for rehab at d/c.     Time Calculation:    Time Calculation- OT     Row Name 11/19/22 1250             Time Calculation-     OT Start Time 1011  -      OT Stop Time 1044  -      OT Time Calculation (min) 33 min  -      Total Timed Code Minutes- OT 0 minute(s)  -      OT Received On 11/19/22  -      OT - Next Appointment 11/21/22  -      OT Goal Re-Cert Due Date 12/03/22  -            User Key  (r) = Recorded By, (t) = Taken By, (c) = Cosigned By    Initials Name Provider Type     Ana Rapp OT Occupational Therapist              Therapy Charges for Today     Code Description Service Date Service Provider Modifiers Qty    [id number]  OT EVAL MOD COMPLEXITY 4 11/19/2022 Ana Rapp OT GO 1               Ana Rapp OT  11/19/2022  "

## 2022-11-19 NOTE — THERAPY EVALUATION
Patient Name: Kathe Burt  : 1955    MRN: [id number]                              Today's Date: 2022       Admit Date: 2022    Visit Dx:     ICD-10-CM ICD-9-CM   1. Fall, initial encounter  W19.XXXA E888.9   2. Acute right ankle pain  M25.571 719.47     338.19   3. Acute pain of right foot  M79.671 729.5     Patient Active Problem List   Diagnosis   • Fall, initial encounter   • History of CVA (cerebrovascular accident)   • Right sided weakness due to previous CVA   • Trimalleolar fracture of ankle, closed, right, initial encounter     Past Medical History:   Diagnosis Date   • Stroke (HCC)      History reviewed. No pertinent surgical history.   General Information     Row Name 22 1453          Physical Therapy Time and Intention    Document Type evaluation  -PG     Mode of Treatment physical therapy  -PG     Row Name 22 1453          General Information    Patient Profile Reviewed yes  -PG     Prior Level of Function --  Patient and her spouse were poor historian, patient stated she uses RW for w/c transfer and spouse assist with ADLs. Neighbor provides transportation when needed.  -PG     Existing Precautions/Restrictions weight bearing  TTWB  -PG     Barriers to Rehab --  R side stroke  -PG     Row Name 22 145          Living Environment    People in Home spouse  -PG     Row Name 22 1453          Home Main Entrance    Number of Stairs, Main Entrance six  -PG     Stair Railings, Main Entrance railings safe and in good condition  -PG     Row Name 22 1458          Cognition    Orientation Status (Cognition) oriented x 4  -PG     Row Name 22 1452          Safety Issues, Functional Mobility    Safety Issues Affecting Function (Mobility) safety precaution awareness;insight into deficits/self-awareness;safety precautions follow-through/compliance  -PG     Impairments Affecting Function (Mobility) balance;postural/trunk control;range of motion  (ROM);endurance/activity tolerance;strength  -PG           User Key  (r) = Recorded By, (t) = Taken By, (c) = Cosigned By    Initials Name Provider Type    Glenny Sood PT Physical Therapist               Mobility     Row Name 11/19/22 1450          Bed Mobility    Bed Mobility bed mobility (all) activities  -PG     All Activities, [place] (Bed Mobility) minimum assist (75% patient effort);2 person assist  -PG     Row Name 11/19/22 0156          Sit-Stand Transfer    Sit-Stand [place] (Transfers) moderate assist (50% patient effort);2 person assist  -PG     Comment, (Sit-Stand Transfer) --  pivot transfer to bed side chair  -PG           User Key  (r) = Recorded By, (t) = Taken By, (c) = Cosigned By    Initials Name Provider Type    Glenny Sood PT Physical Therapist               Obj/Interventions     Row Name 11/19/22 145          Strength Comprehensive (MMT)    Comment, General Manual Muscle Testing (MMT) Assessment Grossly 3+ LUE and grossly 4/5 LLE  -PG     Row Name 11/19/22 1450          Balance    Balance Assessment sitting static balance;sitting dynamic balance;sit to stand dynamic balance  -PG     Static Sitting Balance modified independence  -PG     Dynamic Sitting Balance minimal assist;2-person assist  -PG     Position, Sitting Balance sitting edge of bed  -PG     Sit to Stand Dynamic Balance moderate assist;2-person assist  -PG     Dynamic Standing Balance not tested  -PG           User Key  (r) = Recorded By, (t) = Taken By, (c) = Cosigned By    Initials Name Provider Type    Glenny Sood PT Physical Therapist               Goals/Plan     Row Name 11/19/22 5931          Bed Mobility Goal 1 (PT)    Activity/Assistive Device (Bed Mobility Goal 1, PT) bed mobility activities, all  -PG     [place]ams Level/Cues Needed (Bed Mobility Goal 1, PT) modified independence  -PG     Time Frame (Bed Mobility Goal 1, PT) long term goal (LTG);2 weeks  -PG     Row Name 11/19/22 0011           Transfer Goal 1 (PT)    Activity/Assistive Device (Transfer Goal 1, PT) transfers, all  -PG     [place] Level/Cues Needed (Transfer Goal 1, PT) modified independence  -PG     Time Frame (Transfer Goal 1, PT) long term goal (LTG);2 weeks  -PG     Row Name 11/19/22 1512          Problem Specific Goal 1 (PT)    Problem Specific Goal 1 (PT) To increase strength and endurance  -PG     Time Frame (Problem Specific Goal 1, PT) long-term goal (LTG);2 weeks  -PG     Row Name 11/19/22 1512          Therapy Assessment/Plan (PT)    Planned Therapy Interventions (PT) balance training;bed mobility training;home exercise program;patient/family education;postural re-education;transfer training;stretching;strengthening;ROM (range of motion);neuromuscular re-education  -PG           User Key  (r) = Recorded By, (t) = Taken By, (c) = Cosigned By    Initials Name Provider Type    PG Glenny Wagner, PT Physical Therapist               Clinical Impression     Row Name 11/19/22 1502          Pain    Pretreatment Pain Rating 3/10  -PG     Posttreatment Pain Rating 6/10  -PG     Pain Location - Side/Orientation Right  LEG  -PG     Row Name 11/19/22 1502          Plan of Care Review    Plan of Care Reviewed With patient  -PG     Progress improving  -PG     Outcome Evaluation Patient 66 yo female admitted to the hospital with acute R ankle pain following fall. X Ray ankle shows Comminuted trimalleolar fracture dislocation of the ankle. Patient underwent Open reduction and fixation right trimalleolar ankle fracture without posterior fixation on 11/18/22. Patient is allowed to do TTWB. At baseline, patient resides with spouse at home. Patient had R sided stroke. As per patient, Patient uses walker for w/c transfer. Spouse assist with ADLs except driving. Neighbor provides transportation when needed. As per today's evaluation,and required Min A x2 for Bed Mob and Mod Ax2 for sit to stand and pivot transfer to chair. PT recommends SNF  to return to her PLOF. PT will follow while her stay at [facility].  -PG     Row Name 11/19/22 1502          Therapy Assessment/Plan (PT)    Patient/Family Therapy Goals Statement (PT) To improve mobility  -PG     Rehab Potential (PT) good, to achieve stated therapy goals  -PG     Criteria for Skilled Interventions Met (PT) yes;skilled treatment is necessary  -PG     Therapy Frequency (PT) 3 times/wk  -PG     Predicted Duration of Therapy Intervention (PT) until d/c  -PG     Row Name 11/19/22 1502          Positioning and Restraints    Pre-Treatment Position in bed  -PG     Post Treatment Position chair  -PG     In Bed notified nsg;call light within reach;encouraged to call for assist;exit alarm on  -PG           User Key  (r) = Recorded By, (t) = Taken By, (c) = Cosigned By    Initials Name Provider Type    PG Glenny Wagner, PT Physical Therapist               Outcome Measures     Row Name 11/19/22 1515 11/19/22 0851       How much help from another person do you currently need...    Turning from your back to your side while in flat bed without using bedrails? 2  -PG 3  -KI    Moving from lying on back to sitting on the side of a flat bed without bedrails? 2  -PG 2  -KI    Moving to and from a bed to a chair (including a wheelchair)? 2  -PG 2  -KI    Standing up from a chair using your arms (e.g., wheelchair, bedside chair)? 2  -PG 1  -KI    Climbing 3-5 steps with a railing? 1  -PG 1  -KI    To walk in hospital room? 1  -PG 1  -KI    AM-PAC 6 Clicks Score (PT) 10  -PG 10  -KI    Highest level of mobility 4 --> Transferred to chair/commode  -PG 4 --> Transferred to chair/commode  -KI    Row Name 11/19/22 1515          Modified Whitley Scale    Modified Whitley Scale 4 - Moderately severe disability.  Unable to walk without assistance, and unable to attend to own bodily needs without assistance.  -PG     Row Name 11/19/22 1515 11/19/22 1248       Functional Assessment    Outcome Measure Options AM-PAC 6 Clicks Basic  Mobility (PT);Modified [place]  -PG AM-PAC 6 Clicks Daily Activity (OT)  -          User Key  (r) = Recorded By, (t) = Taken By, (c) = Cosigned By    Initials Name Provider Type     Ana Rapp OT Occupational Therapist    Sarita Jimenez, RN Registered Nurse    PG Glenny Wagner, PT Physical Therapist                             Physical Therapy Education     Title: PT OT SLP Therapies (In Progress)     Topic: Physical Therapy (Done)     Point: Mobility training (Done)     Learning Progress Summary           Patient Acceptance, E, VU by PG at 11/19/2022 1516                   Point: Home exercise program (Done)     Learning Progress Summary           Patient Acceptance, E, VU by PG at 11/19/2022 1516                   Point: Body mechanics (Done)     Learning Progress Summary           Patient Acceptance, E, VU by PG at 11/19/2022 1516                   Point: Precautions (Done)     Learning Progress Summary           Patient Acceptance, E, VU by PG at 11/19/2022 1516                               User Key     Initials Effective Dates Name Provider Type Discipline     09/01/22 -  Glenny Wagner, CESAR Physical Therapist PT              PT Recommendation and Plan  Planned Therapy Interventions (PT): balance training, bed mobility training, home exercise program, patient/family education, postural re-education, transfer training, stretching, strengthening, ROM (range of motion), neuromuscular re-education  Plan of Care Reviewed With: patient  Progress: improving  Outcome Evaluation: Patient 68 yo female admitted to the hospital with acute R ankle pain following fall. X Ray ankle shows Comminuted trimalleolar fracture dislocation of the ankle. Patient underwent Open reduction and fixation right trimalleolar ankle fracture without posterior fixation on 11/18/22. Patient is allowed to do TTWB. At baseline, patient resides with spouse at home. Patient had R sided stroke. As per patient, Patient uses walker  for w/c transfer. Spouse assist with ADLs except driving. Neighbor provides transportation when needed. As per today's evaluation,and required Min A x2 for Bed Mob and Mod Ax2 for sit to stand and pivot transfer to chair. PT recommends SNF to return to her PLOF. PT will follow while her stay at [facility].     Time Calculation:    PT Charges     Row Name 11/19/22 1521             Time Calculation    Start Time 1011  -PG      Stop Time 1044  -PG      Time Calculation (min) 33 min  -PG      PT Received On 11/19/22  -PG      PT - Next Appointment 11/21/22  -PG      PT Goal Re-Cert Due Date 12/03/22  -PG         Time Calculation- PT    Total Timed Code Minutes- PT 0 minute(s)  -PG            User Key  (r) = Recorded By, (t) = Taken By, (c) = Cosigned By    Initials Name Provider Type    Glenny Sood, PT Physical Therapist              Therapy Charges for Today     Code Description Service Date Service Provider Modifiers Qty    [id number] HC PT EVAL MOD COMPLEXITY 4 11/19/2022 Glenny Wagner PT GP 1          PT G-Codes  Outcome Measure Options: AM-PAC 6 Clicks Basic Mobility (PT), Modified [place]  AM-PAC 6 Clicks Score (PT): 10  AM-PAC 6 Clicks Score (OT): 11  Modified [place] Scale: 4 - Moderately severe disability.  Unable to walk without assistance, and unable to attend to own bodily needs without assistance.  PT Discharge Summary  Anticipated Discharge Disposition (PT): skilled nursing facility    Glenny Wagner PT  11/19/2022

## 2022-11-19 NOTE — PROGRESS NOTES
[facility] Progress Note      Patient Name: Kathe Burt  : 1955  MRN: [id number]  Primary Care Physician:  Gavi Calvert APRN  Date of admission: 2022    Subjective     Interval History:   Feels well, pain controlled, eating and drinking, no soa n/v or cp    Review of Systems:   14 point review of systems is otherwise negative except for mentioned above on HPI    Objective     Vitals:   Temp:  [97.4 °F (36.3 °C)-98.7 °F (37.1 °C)] 97.6 °F (36.4 °C)  Heart Rate:  [] 95  Resp:  [10-19] 16  BP: ()/(47-79) 128/79  Flow (L/min):  [2-6] 2    Intake/Output Summary (Last 24 hours) at 2022 1238  Last data filed at 2022 0904  Gross per 24 hour   Intake 3710 ml   Output 100 ml   Net 3610 ml       Physical Exam:    General Appearance: alert, oriented x 3, no acute distress   Skin: warm and dry  HEENT: oral mucosa normal, nonicteric sclera  Neck: supple, no JVD  Lungs: CTA  Heart: RRR, normal S1 and S2  Abdomen: soft, nontender, nondistended  : no palpable bladder  Extremities: no edema, cyanosis or clubbing  Neuro: normal speech and mental status     Scheduled Meds:     aspirin, 325 mg, Oral, Daily  polyethylene glycol, 17 g, Oral, Daily  senna-docusate sodium, 2 tablet, Oral, BID  sodium chloride, 10 mL, Intravenous, Q12H      IV Meds:        Results Reviewed:   I have personally reviewed the results from the time of this admission to 2022 12:38 EST     Results from last 7 days   Lab Units 22  0239 22  0510 22   SODIUM mmol/L 138 140 140   POTASSIUM mmol/L 4.5 4.2 4.0   CHLORIDE mmol/L 105 103 105   CO2 mmol/L 16.0* 20.0* 23.0   BUN mg/dL  14   CREATININE mg/dL 1.59* 1.48* 1.06*   CALCIUM mg/dL 8.6 9.2 9.7   BILIRUBIN mg/dL  --   --  0.4   ALK PHOS U/L  --   --  174*   ALT (SGPT) U/L  --   --  16   AST (SGOT) U/L  --   --  16   GLUCOSE mg/dL 122* 103* 110*     Estimated Creatinine Clearance: 30.4 mL/min (A) (by C-G  formula based on SCr of 1.59 mg/dL (H)).  Results from last 7 days   Lab Units 11/19/22  0239   PHOSPHORUS mg/dL 3.5         Results from last 7 days   Lab Units 11/19/22  0239 11/18/22  0510 11/17/22 2024   WBC 10*3/mm3 6.30 6.80 11.20*   HEMOGLOBIN g/dL 11.0* 11.3* 12.6   PLATELETS 10*3/mm3 180 348 361           Assessment / Plan     ASSESSMENT:  1. cuy1h-re says she was followed in the past by a nephrologist who is no longer in the area, she desires follow up with me post dc, will arrange; likely due to past nsaid use and possibly past htn  2. Persistent proteinuria-will require renal followup  3. S/p trimalleolar anklefracture    PLAN:  1. Avoid nsaids and other  Nephrotoxins  2. heplock ivf  3. Check renal ultrasound  4. Dc home is ok from a renal standpoint with renal follow up    Thank you for involving us in the care of Kateh Burt.  Please feel free to call with any questions.    Austin Romero MD  11/19/22  12:38 EST    [facility] \[facility]\""  [phone]

## 2022-11-19 NOTE — PLAN OF CARE
Pt is resting in her bed with no complaint of pain.  Pt is alert and oriented.   Problem: Skin Injury Risk Increased  Goal: Skin Health and Integrity  Outcome: Ongoing, Progressing     Problem: Fall Injury Risk  Goal: Absence of Fall and Fall-Related Injury  Outcome: Ongoing, Progressing  Intervention: Promote Injury-Free Environment  Recent Flowsheet Documentation  Taken 11/19/2022 0711 by Sarita Rodrigues RN  Safety Promotion/Fall Prevention:   safety round/check completed   fall prevention program maintained   clutter free environment maintained   assistive device/personal items within reach     Problem: Adult Inpatient Plan of Care  Goal: Plan of Care Review  Outcome: Ongoing, Progressing  Flowsheets (Taken 11/19/2022 0711)  Progress: improving  Plan of Care Reviewed With: patient  Outcome Evaluation: Pt is resting with no pain in her bed.  Goal: Patient-Specific Goal (Individualized)  Outcome: Ongoing, Progressing  Goal: Absence of Hospital-Acquired Illness or Injury  Outcome: Ongoing, Progressing  Intervention: Identify and Manage Fall Risk  Flowsheets (Taken 11/19/2022 0711)  Safety Promotion/Fall Prevention:   safety round/check completed   fall prevention program maintained   clutter free environment maintained   assistive device/personal items within reach  Intervention: Prevent Skin Injury  Flowsheets (Taken 11/19/2022 0408 by Magalys Mcdaniels LPN)  Body Position: position changed independently  Intervention: Prevent and Manage VTE (Venous Thromboembolism) Risk  Flowsheets (Taken 11/19/2022 0711)  Activity Management: (toe-touch WB)   activity adjusted per tolerance   other (see comments)  VTE Prevention/Management: (see MAR)   sequential compression devices off   other (see comments)  Intervention: Prevent Infection  Flowsheets (Taken 11/19/2022 0711)  Infection Prevention:   personal protective equipment utilized   rest/sleep promoted   single patient room provided   hand hygiene promoted  Goal:  Optimal Comfort and Wellbeing  Outcome: Ongoing, Progressing  Intervention: Monitor Pain and Promote Comfort  Flowsheets (Taken 11/18/2022 1959 by Sarah Benton, RN)  Pain Management Interventions: see MAR  Intervention: Provide Person-Centered Care  Flowsheets (Taken 11/18/2022 2033 by Magalys Mcdaniels LPN)  Trust Relationship/Rapport:   care explained   choices provided   questions answered   questions encouraged  Goal: Readiness for Transition of Care  Outcome: Ongoing, Progressing  Intervention: Mutually Develop Transition Plan  Flowsheets (Taken 11/18/2022 1424 by Naegele, Megan, RN)  Discharge Facility/Level of Care Needs: nursing facility, skilled  Equipment Needed After Discharge: none  Equipment Currently Used at Home:   cane, quad   wheelchair   rollator   grab bar   bp cuff   sling  Anticipated Changes Related to Illness: none  Transportation Anticipated: family or friend will provide  Outpatient/Agency/Support Group Needs: skilled nursing facility  Transportation Concerns: none  Current Discharge Risk:   physical impairment   dependent with mobility/activities of daily living  Concerns to be Addressed:   care coordination/care conferences   discharge planning  Readmission Within the Last 30 Days: no previous admission in last 30 days  Patient/Family Anticipated Services at Transition: skilled nursing  Patient/Family Anticipates Transition to: inpatient rehabilitation facility     Problem: Adjustment to Injury (Hip Fracture Medical Management)  Goal: Optimal Coping with Change in Health Status  Outcome: Ongoing, Progressing  Intervention: Support Psychosocial Response to Injury  Flowsheets (Taken 11/18/2022 2033 by Magalys Mcdaniels LPN)  Family/Support System Care:   involvement promoted   self-care encouraged   presence promoted   support provided     Problem: Bleeding (Hip Fracture Medical Management)  Goal: Absence of Bleeding  Outcome: Ongoing, Progressing     Problem: Bowel Elimination Impaired (Hip  Fracture Medical Management)  Goal: Effective Bowel Elimination  Outcome: Ongoing, Progressing  Intervention: Promote Effective Bowel Elimination  Flowsheets (Taken 11/19/2022 0711)  Bowel Elimination Management: relaxation techniques promoted  Bowel Elimination Promotion: adequate fluid intake promoted     Problem: Cognitive Decline Risk (Hip Fracture Medical Management)  Goal: Baseline Cognitive Function Maintained  Outcome: Ongoing, Progressing  Intervention: Maximize Cognitive Function  Flowsheets  Taken 11/19/2022 0711 by Sarita Rodrigues RN  Sleep/Rest Enhancement: relaxation techniques promoted  Taken 11/18/2022 2033 by Magalys Mcdaniels LPN  Reorientation Measures: clock in view     Problem: Embolism (Hip Fracture Medical Management)  Goal: Absence of Embolism  Outcome: Ongoing, Progressing  Intervention: Prevent or Manage Embolism Risk  Flowsheets (Taken 11/19/2022 0711)  VTE Prevention/Management: (see MAR)   sequential compression devices off   other (see comments)     Problem: Fracture Stabilization and Management (Hip Fracture Medical Management)  Goal: Fracture Stability  Outcome: Ongoing, Progressing  Intervention: Promote Fracture Stability and Healing  Flowsheets  Taken 11/19/2022 0711 by Sarita Rodrigues RN  Fracture Immobilization:   supported during position changes   supported with pillows  Taken 11/18/2022 2033 by Magalys Mcdaniels LPN  Equipment:   arm, right   arm sling     Problem: Functional Ability Impaired (Hip Fracture Medical Management)  Goal: Optimal Functional Performance  Outcome: Ongoing, Progressing  Intervention: Promote Optimal Functional Status  Flowsheets (Taken 11/19/2022 0711)  Activity Management: (toe-touch WB)   activity adjusted per tolerance   other (see comments)     Problem: Pain (Hip Fracture Medical Management)  Goal: Acceptable Pain Level  Outcome: Ongoing, Progressing  Intervention: Manage Acute Orthopaedic-Related Pain  Flowsheets (Taken 11/18/2022 1959 by  Sarah Benton, RN)  Pain Management Interventions: see MAR     Problem: Urinary Elimination Impaired (Hip Fracture Medical Management)  Goal: Effective Urinary Elimination  Outcome: Ongoing, Progressing  Intervention: Support Effective Urinary Elimination  Flowsheets (Taken 11/19/2022 0711)  Urinary Elimination Promotion:   frequent voiding encouraged   toileting offered     Problem: Pain Acute  Goal: Acceptable Pain Control and Functional Ability  Outcome: Ongoing, Progressing  Intervention: Prevent or Manage Pain  Flowsheets  Taken 11/19/2022 0711 by Sarita Rodrigues RN  Bowel Elimination Promotion: adequate fluid intake promoted  Sleep/Rest Enhancement: relaxation techniques promoted  Taken 11/18/2022 2033 by Magalys Mcdaniels LPN  Medication Review/Management: medications reviewed  Intervention: Develop Pain Management Plan  Flowsheets (Taken 11/18/2022 1959 by Sarah Benton RN)  Pain Management Interventions: see MAR  Intervention: Optimize Psychosocial Wellbeing  Flowsheets (Taken 11/18/2022 2033 by Magalys Mcdaniels LPN)  Diversional Activities:   television   smartphone     Problem: Surgical Site Infection  Goal: Absence of Infection Signs and Symptoms  Outcome: Ongoing, Progressing  Intervention: Prevent or Manage Infection  Flowsheets (Taken 11/19/2022 0711)  Fluid/Electrolyte Management: fluids provided  Fever Reduction/Comfort Measures: lightweight bedding   Goal Outcome Evaluation:  Plan of Care Reviewed With: patient        Progress: improving  Outcome Evaluation: Pt is resting with no pain in her bed.

## 2022-11-19 NOTE — OP NOTE
ANKLE OPEN REDUCTION INTERNAL FIXATION  Procedure Report    Patient Name:  Kathe Burt  YOB: 1955    Date of Surgery:  11/18/2022         Pre-op Diagnosis: Right trimalleolar ankle fracture    Post-op diagnosis: Same    Indications:   67-year white female with prior stroke affecting her right side.  Sustained a trimalleolar ankle fracture with moderate displacement indicated reduce and stabilize this to prevent malunion      Procedure/CPT® Codes:      Procedure(s):  Open reduction and fixation right trimalleolar ankle fracture without posterior fixation    Staff:  Surgeon(s):  Taran Marie MD         Anesthesia: General with block    Estimated Blood Loss: 0    Implants:    Implant Name Type Inv. Item Serial No.  Lot No. LRB No. Used Action   PLT STR ANKL TBG ORTHOLOC 3DI 8HL - [id number] Implant PLT STR ANKL TBG ORTHOLOC 3DI 8HL  [employer] . Right 1 Implanted   SCRW FLORENCE NL ORTHOLOC LP  3.5X12MM - [id number] Implant SCRW FLORENCE NL ORTHOLOC LP  3.5X12MM  [employer] TECH . Right 1 Implanted   SCRW FLORENCE NL ORTHOLOC LP 3.5X10MM - [id number] Implant SCRW FLORENCE NL ORTHOLOC LP 3.5X10MM  [employer] TECH . Right 1 Implanted   SCRW LK ORTHOLOC 3DI 3.5X16MM - [id number] Implant SCRW LK ORTHOLOC 3DI 3.5X16MM  [employer] TECH . Right 1 Implanted   SCRW FLORENCE ORTHOLOC 3DI LP 3.5X14MM - [id number] Implant SCRW FLORENCE ORTHOLOC 3DI LP 3.5X14MM  [employer] . Right 1 Implanted   PLT MALLEOLAR ORTHOLOC 3DI LP MEDL SM - [id number] Implant PLT MALLEOLAR ORTHOLOC 3DI LP MEDL SM  [employer] . Right 1 Implanted   SCRW FLORENCE NL ORTHOLOC LP 3.5X30MM - [id number] Implant SCRW FLORENCE NL ORTHOLOC LP 3.5X30MM  [employer] TECH . Right 1 Implanted   SCRW FLORENCE NL ORTHOLOC LP 3.5X34MM - [id number] Implant SCRW FLORENCE NL ORTHOLOC LP 3.5X34MM  [employer] . Right 1 Implanted   SCRW LK ORTHOLOC LG HD 2.7X10MM - [id number] Implant SCRW LK ORTHOLOC LG HD 2.7X10MM  [employer]  TECH . Right 2 Implanted   SCRW LK ORTHOLOC LG HD 2.7X20MM - [id number] Implant SCRW LK ORTHOLOC LG HD 2.7X20MM  [employer] TECH . Right 1 Implanted   SCRW LK ORTHOLOC 3DI 3.5X20MM - [id number] Implant SCRW LK ORTHOLOC 3DI 3.5X20MM  [employer] TECH . Right 1 Implanted   SCRW LK ORTHOLOC 3DI 3.5X10MM - [id number] Implant SCRW LK ORTHOLOC 3DI 3.5X10MM  [employer] TECH . Right 1 Implanted       Specimen:          0        Findings: Comminuted Young B lateral malleolar fracture, comminuted medial malleolar fracture, mildly displaced small posterior fracture    Complications: None    Description of Procedure: Patient was seen in holding room and consented by me.  Had the operative extremity initialed by me.  Preoperative antibiotics 2 g Kefzol given.  Supine positioning with anesthesia with a bump under the operative hip.  The operative extremity was prepped and draped sterilely.  Tourniquet was inflated on the upper thigh to 250 mmHg.  Total tourniquet time was 38 minutes.  The lateral incision was made along the lateral malleolus from the tip proximally.  Dissection was done to the fracture site avoiding tendon and neurovascular structures.  The fracture site was cleaned out and reduced with clamps.  A lag screw was placed from anterior proximal to posterior distal using standard lag technique with a Grimes 3.5 mm cortical screw.  The Grimes one-third tubular titanium plate with 8 holes was bent to match the shape of the lateral malleolus.  Plate was used to span the comminuted segment anchored in the second most distal screw with  a 3.5 mm unicortical  screw.   Anchored proximally with 2 3.5 mm cortical screws.  Finally a 3.5 mm unicortical locking screw was placed at the distal and proximal end of the plate.    The medial side was exposed through a 4 cm longitudinal incision.  Dissection was then down to the fracture avoiding saphenous nerve and vein.  Fracture site was cleaned out and reduced but was in  multiple pieces and cannot be leg together.  Therefore a medial plate was applied to buttress the fracture.  Anchored proximally with 2 3.5 mm cortical screws to 1 cm locking screw and a 3.5 mm locking screw.  Distally to two 2.7 mm locking screws were placed although the bone was very comminuted with poor fixation  Patient was tested for stability fluoroscopically and final C-arm views were saved.  The wounds were irrigated.  Tourniquet released.  Subcu tissue was closed with 2-0 Vicryl and skin with staples.   Patient was left stable in the OR.  Placed in sterile dressing and a posterior splint in neutral dorsiflexion.    Plan is to be toe-touch weightbearing for 6 weeks with staple removal in 10 to 12 days.      Taran Marie MD     Date: 11/18/2022  Time: 19:19 EST

## 2022-11-19 NOTE — PROGRESS NOTES
[facility] Hospital Medicine Services        Patient Name: Kathe Burt  : 1955  MRN: [id number]  Primary Care Physician:  Provider, No Known  Date of admission: 2022        Subjective       Chief Complaint: Right ankle pain following a fall     History of Present Illness: Kathe Burt is a 67 y.o. female with a past medical history of a CVA approximately 18 years ago with residual right-sided weakness who presented to [facility] on 2022 complaining of right ankle pain following a fall.  She states that she was using the restroom and when she stood up she lost her balance and fell to the ground landing on her right side.  She was unable to get up from the fall.  Her family found her approximately 2 hours later and called EMS.  She denies hitting her head or any loss of consciousness.  She describes her pain is located in her right lateral heel, at throbbing and she rates it currently a 9 out of 10.  She denies any weakness or dizziness prior to the fall, she reports that she did not take any pain medication prior to the fall.  Chest pain, shortness of breath.  She does state that this is her second fall recently.  On 2022 she fell out of bed and sustained a right proximal humeral neck fracture.  Her arm was placed in a sling and and she released from the [facility] ED to follow-up with orthopedic MD outpatient.  She denies sustaining any new injury to her arm in the most recent fall.     In the ED, an x-ray of her right ankle shows a comminuted trimalleolar fracture dislocation of the ankle.  There is a large amount of soft tissue swelling extending from the ankle dislocation.  There is irregularity at the medial aspect of the navicular bone which may represent fracture or secondary ossification center.  Creatinine is 1.06, WBC 11.2, CK is 64, CK-MB less than 1.  All other labs are unremarkable.  She is afebrile, all vital signs are stable.  Her right  extremity was placed in a posterior splint and stirrup plaster splint in the ED.  Orthopedic surgery was consulted and plan to take patient to the OR tomorrow afternoon.  Hospitalist was consulted for further care and management.   11/18/2022; patient is lying in the bed, still complaining of a lot of pain right lower extremity hemodynamically stable patient to go for ORIF this afternoon.  11/19/2022; patient is sitting on easy chair at the bedside, pain is controlled, hemodynamically stable, PT OT recommending subacute rehab await placement,     Review of Systems   Constitutional: Negative.   HENT: Negative.    Eyes: Negative.    Cardiovascular: Negative.    Respiratory: Negative.    Hematologic/Lymphatic: Negative.    Skin: Negative.    Musculoskeletal: Positive for falls and joint pain (Right ankle).   Gastrointestinal: Negative.    Genitourinary: Negative.    Neurological: Negative.    Psychiatric/Behavioral: Negative.    Allergic/Immunologic: Negative.          Personal History      Medical History        Past Medical History:   Diagnosis Date   • Stroke (HCC)              Surgical History   History reviewed. No pertinent surgical history.        Family History: family history is not on file. Otherwise pertinent FHx was reviewed and not pertinent to current issue.     Social History:       Home Medications:           Prior to Admission Medications      Prescriptions Last Dose Informant Patient Reported? Taking?     HYDROcodone-acetaminophen (NORCO) 5-325 MG per tablet     No No     Take 1 tablet by mouth Every 6 (Six) Hours As Needed for Moderate Pain.                Allergies:  No Known Allergies     Objective       Vitals:   Temp:  [97.4 °F (36.3 °C)-98.7 °F (37.1 °C)] 97.6 °F (36.4 °C)  Heart Rate:  [] 95  Resp:  [10-19] 16  BP: ()/(47-79) 128/79  Flow (L/min):  [2-6] 2     Physical Exam  Vitals and nursing note reviewed.   Constitutional:       Appearance: Normal appearance.   HENT:      Head:  Normocephalic and atraumatic.      Nose: Nose normal.      Mouth/Throat:      Mouth: Mucous membranes are moist.   Eyes:      Extraocular Movements: Extraocular movements intact.      Pupils: Pupils are equal, round, and reactive to light.   Cardiovascular:      Rate and Rhythm: Normal rate and regular rhythm.      Pulses: Normal pulses.      Heart sounds: Normal heart sounds.   Pulmonary:      Effort: Pulmonary effort is normal.      Breath sounds: Normal breath sounds.   Abdominal:      General: Bowel sounds are normal.      Palpations: Abdomen is soft.   Musculoskeletal:         General: Normal range of motion.      Right upper arm: Tenderness (Right arm in a sling due to previous fracture) present.      Cervical back: Normal range of motion.      Right lower leg: Right lower leg covered in Ace wrap.     Comments: Capillary refill less than 2 on right lower extremity, sensation is intact   Skin:     General: Skin is warm and dry.   Neurological:      General: No focal deficit present.      Mental Status: She is alert and oriented to person, place, and time. Mental status is at baseline.   Psychiatric:         Mood and Affect: Mood normal.         Behavior: Behavior normal.         Result Review    Result Review:  I have personally reviewed the results from the time of this admission to 11/18/2022 00:57 EST and agree with these findings:  [x]?  Laboratory  []?  Microbiology  [x]?  Radiology  []?  EKG/Telemetry   []?  Cardiology/Vascular   []?  Pathology  []?  Old records  []?  Other:  Most notable findings include: As above  Lab Results   Component Value Date    GLUCOSE 122 (H) 11/19/2022    CALCIUM 8.6 11/19/2022     11/19/2022    K 4.5 11/19/2022    CO2 16.0 (L) 11/19/2022     11/19/2022    BUN 19 11/19/2022    CREATININE 1.59 (H) 11/19/2022    BCR 11.9 11/19/2022    ANIONGAP 17.0 (H) 11/19/2022     Lab Results   Component Value Date    WBC 6.30 11/19/2022    HGB 11.0 (L) 11/19/2022    HCT 35.6  11/19/2022    MCV 92.2 11/19/2022     11/19/2022        Assessment & Plan          Active Hospital Problems:       Active Hospital Problems     Diagnosis     • **Trimalleolar fracture of ankle, closed, right, initial encounter     • Fall, initial encounter     • History of CVA (cerebrovascular accident)     • Right sided weakness due to previous CVA        Plan:      Trimalleolar fracture of ankle  -Ankle x-ray reviewed  -RLE placed in posterior splint and stirrup plaster splint in ED  -Orthopedic surgeon consult appreciated and following  -S/p ORIF right ankle on 11/18/2022.  -Pain medication ordered as needed  -Patient will need PT/OT following surgery due to multiple recent falls with injuries   -Case management consult for possible rehab placement due to multiple recent falls with injury  - Toe-touch weight-bear for 6 weeks.  Likely need rehab placement     Patient has a previous history of a CVA approximately 18 years ago  -Residual right-sided weakness noted  -Patient reports no home medications   Acute kidney injury; most likely prerenal serum creatinine on admission 1.4, up to 1.59 today patient received IV fluid, nephrology consulted and following.     Hyperglycemia; stress/reactive check A1c.  Disposition; PT OT eval, await placement in subacute rehab.  DVT prophylaxis:  Mechanical DVT prophylaxis orders are present.     CODE STATUS:    Code Status (Patient has no pulse and is not breathing): CPR (Attempt to Resuscitate)  Medical Interventions (Patient has pulse or is breathing): Full Support     Admission Status:  I believe this patient meets inpatient status.     I discussed the patient's findings and my recommendations with patient.     This patient has been examined wearing appropriate Personal Protective Equipment.    Electronically signed by Theresa Bryant MD, 11/19/22, 12:05 PM EST.

## 2022-11-20 ENCOUNTER — APPOINTMENT (OUTPATIENT)
Dept: ULTRASOUND IMAGING | Facility: HOSPITAL | Age: 67
DRG: 492 | End: 2022-11-20
Payer: MEDICARE

## 2022-11-20 LAB
ALBUMIN SERPL-MCNC: 3.6 G/DL (ref 3.5–5.2)
ANION GAP SERPL CALCULATED.3IONS-SCNC: 18 MMOL/L (ref 5–15)
BASOPHILS # BLD AUTO: 0 10*3/MM3 (ref 0–0.2)
BASOPHILS NFR BLD AUTO: 0.1 % (ref 0–1.5)
BUN SERPL-MCNC: 23 MG/DL (ref 8–23)
BUN/CREAT SERPL: 15.3 (ref 7–25)
CALCIUM SPEC-SCNC: 8.8 MG/DL (ref 8.6–10.5)
CHLORIDE SERPL-SCNC: 103 MMOL/L (ref 98–107)
CO2 SERPL-SCNC: 18 MMOL/L (ref 22–29)
CREAT SERPL-MCNC: 1.5 MG/DL (ref 0.57–1)
DEPRECATED RDW RBC AUTO: 48.1 FL (ref 37–54)
EGFRCR SERPLBLD CKD-EPI 2021: 37.8 ML/MIN/1.73
EOSINOPHIL # BLD AUTO: 0 10*3/MM3 (ref 0–0.4)
EOSINOPHIL NFR BLD AUTO: 0.3 % (ref 0.3–6.2)
ERYTHROCYTE [DISTWIDTH] IN BLOOD BY AUTOMATED COUNT: 15.3 % (ref 12.3–15.4)
GLUCOSE SERPL-MCNC: 95 MG/DL (ref 65–99)
HCT VFR BLD AUTO: 33 % (ref 34–46.6)
HGB BLD-MCNC: 10.6 G/DL (ref 12–15.9)
LYMPHOCYTES # BLD AUTO: 1.9 10*3/MM3 (ref 0.7–3.1)
LYMPHOCYTES NFR BLD AUTO: 27.3 % (ref 19.6–45.3)
MCH RBC QN AUTO: 29 PG (ref 26.6–33)
MCHC RBC AUTO-ENTMCNC: 32.2 G/DL (ref 31.5–35.7)
MCV RBC AUTO: 89.9 FL (ref 79–97)
MONOCYTES # BLD AUTO: 0.5 10*3/MM3 (ref 0.1–0.9)
MONOCYTES NFR BLD AUTO: 7.6 % (ref 5–12)
NEUTROPHILS NFR BLD AUTO: 4.5 10*3/MM3 (ref 1.7–7)
NEUTROPHILS NFR BLD AUTO: 64.7 % (ref 42.7–76)
NRBC BLD AUTO-RTO: 0 /100 WBC (ref 0–0.2)
PHOSPHATE SERPL-MCNC: 2.4 MG/DL (ref 2.5–4.5)
PHOSPHATE SERPL-MCNC: 3.4 MG/DL (ref 2.5–4.5)
PLATELET # BLD AUTO: 336 10*3/MM3 (ref 140–450)
PMV BLD AUTO: 9.2 FL (ref 6–12)
POTASSIUM SERPL-SCNC: 4.1 MMOL/L (ref 3.5–5.2)
RBC # BLD AUTO: 3.67 10*6/MM3 (ref 3.77–5.28)
SODIUM SERPL-SCNC: 139 MMOL/L (ref 136–145)
WBC NRBC COR # BLD: 6.9 10*3/MM3 (ref 3.4–10.8)

## 2022-11-20 PROCEDURE — 85025 COMPLETE CBC W/AUTO DIFF WBC: CPT | Performed by: ORTHOPAEDIC SURGERY

## 2022-11-20 PROCEDURE — 80069 RENAL FUNCTION PANEL: CPT | Performed by: HOSPITALIST

## 2022-11-20 PROCEDURE — G0378 HOSPITAL OBSERVATION PER HR: HCPCS

## 2022-11-20 PROCEDURE — 76775 US EXAM ABDO BACK WALL LIM: CPT

## 2022-11-20 PROCEDURE — 84100 ASSAY OF PHOSPHORUS: CPT | Performed by: HOSPITALIST

## 2022-11-20 PROCEDURE — 25010000002 MORPHINE PER 10 MG: Performed by: ORTHOPAEDIC SURGERY

## 2022-11-20 RX ORDER — METHOCARBAMOL 500 MG/1
1000 TABLET, FILM COATED ORAL 2 TIMES DAILY
COMMUNITY

## 2022-11-20 RX ORDER — ERGOCALCIFEROL 1.25 MG/1
50000 CAPSULE ORAL
COMMUNITY

## 2022-11-20 RX ORDER — LEVOTHYROXINE SODIUM 0.05 MG/1
50 TABLET ORAL DAILY
COMMUNITY

## 2022-11-20 RX ORDER — DOCUSATE SODIUM 100 MG/1
100 CAPSULE, LIQUID FILLED ORAL DAILY
COMMUNITY

## 2022-11-20 RX ORDER — ATORVASTATIN CALCIUM 40 MG/1
40 TABLET, FILM COATED ORAL NIGHTLY
COMMUNITY

## 2022-11-20 RX ORDER — LISINOPRIL 2.5 MG/1
2.5 TABLET ORAL DAILY
COMMUNITY

## 2022-11-20 RX ORDER — FLUOXETINE HYDROCHLORIDE 20 MG/1
40 CAPSULE ORAL DAILY
COMMUNITY

## 2022-11-20 RX ORDER — PANTOPRAZOLE SODIUM 40 MG/1
40 TABLET, DELAYED RELEASE ORAL DAILY
COMMUNITY

## 2022-11-20 RX ADMIN — ASPIRIN 325 MG ORAL TABLET 325 MG: 325 PILL ORAL at 09:53

## 2022-11-20 RX ADMIN — OXYCODONE 10 MG: 5 TABLET ORAL at 09:53

## 2022-11-20 RX ADMIN — Medication 2 PACKET: at 11:59

## 2022-11-20 RX ADMIN — HYDROCODONE BITARTRATE AND ACETAMINOPHEN 1 TABLET: 7.5; 325 TABLET ORAL at 12:42

## 2022-11-20 RX ADMIN — Medication 10 ML: at 20:46

## 2022-11-20 RX ADMIN — MORPHINE SULFATE 4 MG: 4 INJECTION, SOLUTION INTRAMUSCULAR; INTRAVENOUS at 02:52

## 2022-11-20 RX ADMIN — Medication 10 ML: at 09:55

## 2022-11-20 RX ADMIN — SENNOSIDES AND DOCUSATE SODIUM 2 TABLET: 50; 8.6 TABLET ORAL at 09:53

## 2022-11-20 RX ADMIN — OXYCODONE 10 MG: 5 TABLET ORAL at 01:08

## 2022-11-20 RX ADMIN — POLYETHYLENE GLYCOL 3350 17 G: 17 POWDER, FOR SOLUTION ORAL at 09:53

## 2022-11-20 NOTE — PLAN OF CARE
Goal Outcome Evaluation:  Plan of Care Reviewed With: patient        Progress: improving  Outcome Evaluation: patient provided medication r/t ankle fx as ordered.  Pt resting in bed this shift.  Will  continue with current orders care plans and monitoring.

## 2022-11-20 NOTE — PLAN OF CARE
Continue to monitor and assess pain.  Pt is alert but resting in her bed.  Waiting on Rehab choices to discharge to.   Problem: Skin Injury Risk Increased  Goal: Skin Health and Integrity  Outcome: Ongoing, Progressing     Problem: Fall Injury Risk  Goal: Absence of Fall and Fall-Related Injury  Outcome: Ongoing, Progressing  Intervention: Promote Injury-Free Environment  Recent Flowsheet Documentation  Taken 11/19/2022 1804 by Sarita Rodrigues RN  Safety Promotion/Fall Prevention:   assistive device/personal items within reach   clutter free environment maintained   fall prevention program maintained   safety round/check completed     Problem: Adult Inpatient Plan of Care  Goal: Plan of Care Review  Outcome: Ongoing, Progressing  Flowsheets  Taken 11/20/2022 0724 by Sarita Rodrigues RN  Plan of Care Reviewed With:   patient   spouse  Outcome Evaluation: waiting on rehab choices.  Taken 11/20/2022 0356 by Magalys Mcdaniels LPN  Progress: improving  Goal: Patient-Specific Goal (Individualized)  Outcome: Ongoing, Progressing  Goal: Absence of Hospital-Acquired Illness or Injury  Outcome: Ongoing, Progressing  Intervention: Identify and Manage Fall Risk  Flowsheets  Taken 11/20/2022 0600 by Magalys Mcdaniels LPN  Safety Promotion/Fall Prevention:   clutter free environment maintained   assistive device/personal items within reach   fall prevention program maintained   nonskid shoes/slippers when out of bed   room organization consistent   safety round/check completed  Taken 11/19/2022 1804 by Sarita Rodrigues RN  Safety Promotion/Fall Prevention:   assistive device/personal items within reach   clutter free environment maintained   fall prevention program maintained   safety round/check completed  Intervention: Prevent Skin Injury  Flowsheets  Taken 11/20/2022 0600 by Magalys Mcdaniels LPN  Body Position: position changed independently  Taken 11/19/2022 2007 by Magalys Mcdaniels LPN  Skin Protection:   adhesive use  limited   incontinence pads utilized  Intervention: Prevent and Manage VTE (Venous Thromboembolism) Risk  Flowsheets  Taken 11/19/2022 2007 by Magalys Mcdaniels LPN  VTE Prevention/Management: (see MAR) other (see comments)  Taken 11/19/2022 1719 by Sarita Rodrigues, RN  Activity Management: back to bed  Intervention: Prevent Infection  Flowsheets (Taken 11/20/2022 0600 by Magalys Mcdaniels LPN)  Infection Prevention:   environmental surveillance performed   hand hygiene promoted   personal protective equipment utilized   rest/sleep promoted   single patient room provided  Goal: Optimal Comfort and Wellbeing  Outcome: Ongoing, Progressing  Intervention: Monitor Pain and Promote Comfort  Flowsheets (Taken 11/20/2022 0252 by Magalys Mcdaniels LPN)  Pain Management Interventions:   see MAR   no interventions per patient request   pillow support provided   position adjusted  Intervention: Provide Person-Centered Care  Flowsheets (Taken 11/19/2022 2007 by Magalys Mcdaniels LPN)  Trust Relationship/Rapport:   care explained   choices provided   questions answered   questions encouraged  Goal: Readiness for Transition of Care  Outcome: Ongoing, Progressing  Intervention: Mutually Develop Transition Plan  Flowsheets (Taken 11/18/2022 1424 by Naegele, Megan, RN)  Discharge Facility/Level of Care Needs: nursing facility, skilled  Equipment Needed After Discharge: none  Equipment Currently Used at Home:   cane, quad   wheelchair   rollator   grab bar   bp cuff   sling  Anticipated Changes Related to Illness: none  Transportation Anticipated: family or friend will provide  Outpatient/Agency/Support Group Needs: skilled nursing facility  Transportation Concerns: none  Current Discharge Risk:   physical impairment   dependent with mobility/activities of daily living  Concerns to be Addressed:   care coordination/care conferences   discharge planning  Readmission Within the Last 30 Days: no previous admission in last 30  days  Patient/Family Anticipated Services at Transition: skilled nursing  Patient/Family Anticipates Transition to: inpatient rehabilitation facility     Problem: Adjustment to Injury (Hip Fracture Medical Management)  Goal: Optimal Coping with Change in Health Status  Outcome: Ongoing, Progressing  Intervention: Support Psychosocial Response to Injury  Flowsheets (Taken 11/19/2022 2007 by Magalys Mcdaniels LPN)  Family/Support System Care:   self-care encouraged   support provided     Problem: Bleeding (Hip Fracture Medical Management)  Goal: Absence of Bleeding  Outcome: Ongoing, Progressing     Problem: Bowel Elimination Impaired (Hip Fracture Medical Management)  Goal: Effective Bowel Elimination  Outcome: Ongoing, Progressing  Intervention: Promote Effective Bowel Elimination  Flowsheets  Taken 11/20/2022 0724 by Sarita Rodrigues RN  Bowel Elimination Management: relaxation techniques promoted  Taken 11/19/2022 2007 by Magalys Mcdaniels LPN  Bowel Elimination Promotion: adequate fluid intake promoted     Problem: Cognitive Decline Risk (Hip Fracture Medical Management)  Goal: Baseline Cognitive Function Maintained  Outcome: Ongoing, Progressing  Intervention: Maximize Cognitive Function  Flowsheets  Taken 11/19/2022 2007 by Magalys Mcdaniels LPN  Reorientation Measures: clock in view  Taken 11/19/2022 0711 by Sarita Rodrigues RN  Sleep/Rest Enhancement: relaxation techniques promoted     Problem: Embolism (Hip Fracture Medical Management)  Goal: Absence of Embolism  Outcome: Ongoing, Progressing  Intervention: Prevent or Manage Embolism Risk  Flowsheets (Taken 11/19/2022 2007 by Magalys Mcdaniels LPN)  VTE Prevention/Management: (see MAR) other (see comments)     Problem: Fracture Stabilization and Management (Hip Fracture Medical Management)  Goal: Fracture Stability  Outcome: Ongoing, Progressing  Intervention: Promote Fracture Stability and Healing  Flowsheets  Taken 11/19/2022 2007 by Magalys Mcdaniels  LPN  Equipment:   arm, right   arm sling  Taken 11/19/2022 0711 by Sarita Rodrigues RN  Fracture Immobilization:   supported during position changes   supported with pillows     Problem: Functional Ability Impaired (Hip Fracture Medical Management)  Goal: Optimal Functional Performance  Outcome: Ongoing, Progressing  Intervention: Promote Optimal Functional Status  Flowsheets (Taken 11/19/2022 1719)  Activity Management: back to bed     Problem: Pain (Hip Fracture Medical Management)  Goal: Acceptable Pain Level  Outcome: Ongoing, Progressing  Intervention: Manage Acute Orthopaedic-Related Pain  Flowsheets (Taken 11/20/2022 0252 by Magalys Mcdaniels LPN)  Pain Management Interventions:   see MAR   no interventions per patient request   pillow support provided   position adjusted     Problem: Urinary Elimination Impaired (Hip Fracture Medical Management)  Goal: Effective Urinary Elimination  Outcome: Ongoing, Progressing  Intervention: Support Effective Urinary Elimination  Flowsheets (Taken 11/19/2022 0851)  Urinary Elimination Promotion: catheter patency maintained     Problem: Pain Acute  Goal: Acceptable Pain Control and Functional Ability  Outcome: Ongoing, Progressing  Intervention: Prevent or Manage Pain  Flowsheets  Taken 11/19/2022 2007 by Magalys Mcdaniels LPN  Bowel Elimination Promotion: adequate fluid intake promoted  Medication Review/Management: medications reviewed  Taken 11/19/2022 0711 by Sarita Rodrigues RN  Sleep/Rest Enhancement: relaxation techniques promoted  Intervention: Develop Pain Management Plan  Flowsheets (Taken 11/20/2022 0252 by Magalys Mcdaniels LPN)  Pain Management Interventions:   see MAR   no interventions per patient request   pillow support provided   position adjusted  Intervention: Optimize Psychosocial Wellbeing  Flowsheets (Taken 11/19/2022 2007 by Magalys Mcdaniels LPN)  Diversional Activities:   television   smartphone     Problem: Surgical Site Infection  Goal: Absence of  Infection Signs and Symptoms  Outcome: Ongoing, Progressing  Intervention: Prevent or Manage Infection  Flowsheets (Taken 11/19/2022 2007 by Magalys Mcdaniels LPN)  Fluid/Electrolyte Management: fluids provided  Fever Reduction/Comfort Measures: lightweight bedding   Goal Outcome Evaluation:  Plan of Care Reviewed With: patient, spouse        Progress: improving  Outcome Evaluation: waiting on rehab choices.

## 2022-11-20 NOTE — PROGRESS NOTES
[facility] Progress Note      Patient Name: Kathe Burt  : 1955  MRN: [id number]  Primary Care Physician:  Gavi Calvert APRN  Date of admission: 2022    Subjective     Interval History:   Feels well, pain is less controlled, eating and drinking, no soa n/v or cp    Review of Systems:   14 point review of systems is otherwise negative except for mentioned above on HPI    Objective     Vitals:   Temp:  [97.4 °F (36.3 °C)-97.8 °F (36.6 °C)] 97.8 °F (36.6 °C)  Heart Rate:  [86-95] 88  Resp:  [16] 16  BP: ()/(57-76) 101/66    Intake/Output Summary (Last 24 hours) at 2022 1449  Last data filed at 2022 0957  Gross per 24 hour   Intake 240 ml   Output 900 ml   Net -660 ml       Physical Exam:    General Appearance: alert, oriented x 3, no acute distress   Skin: warm and dry  HEENT: oral mucosa normal, nonicteric sclera  Neck: supple, no JVD  Lungs: CTA  Heart: RRR, normal S1 and S2  Abdomen: soft, nontender, nondistended  : no palpable bladder  Extremities: no edema, cyanosis or clubbing  Neuro: normal speech and mental status     Scheduled Meds:     aspirin, 325 mg, Oral, Daily  polyethylene glycol, 17 g, Oral, Daily  senna-docusate sodium, 2 tablet, Oral, BID  sodium chloride, 10 mL, Intravenous, Q12H      IV Meds:        Results Reviewed:   I have personally reviewed the results from the time of this admission to 2022 14:49 EST     Results from last 7 days   Lab Units 22  0114 22  0239 22  0510 22   SODIUM mmol/L 139 138 140 140   POTASSIUM mmol/L 4.1 4.5 4.2 4.0   CHLORIDE mmol/L 103 105 103 105   CO2 mmol/L 18.0* 16.0* 20.0* 23.0   BUN mg/dL 23 19 17 14   CREATININE mg/dL 1.50* 1.59* 1.48* 1.06*   CALCIUM mg/dL 8.8 8.6 9.2 9.7   BILIRUBIN mg/dL  --   --   --  0.4   ALK PHOS U/L  --   --   --  174*   ALT (SGPT) U/L  --   --   --  16   AST (SGOT) U/L  --   --   --  16   GLUCOSE mg/dL 95 122* 103* 110*     Estimated  Creatinine Clearance: 32.2 mL/min (A) (by C-G formula based on SCr of 1.5 mg/dL (H)).  Results from last 7 days   Lab Units 11/20/22  0114 11/19/22  0239   PHOSPHORUS mg/dL 2.4* 3.5         Results from last 7 days   Lab Units 11/20/22  0114 11/19/22  0239 11/18/22  0510 11/17/22 2024   WBC 10*3/mm3 6.90 6.30 6.80 11.20*   HEMOGLOBIN g/dL 10.6* 11.0* 11.3* 12.6   PLATELETS 10*3/mm3 336 180 348 361           Assessment / Plan     ASSESSMENT:  1. [id number] says she was followed in the past by a nephrologist who is no longer in the area, she desires follow up with me post dc, will arrange; likely due to past nsaid use and possibly past htn  2. Persistent proteinuria-will require renal followup  3. S/p trimalleolar anklefracture    PLAN:  1. Avoid nsaids and other  Nephrotoxins  2. heplocked ivf  3. Check renal ultrasound  4. Dc home is ok from a renal standpoint with renal follow up    Thank you for involving us in the care of Kathe Burt.  Please feel free to call with any questions.    Austin Romero MD  11/20/22  14:49 EST    [facility]  [phone]

## 2022-11-20 NOTE — PROGRESS NOTES
LOS: 1 day   Patient Care Team:  Gavi Calvert APRN as PCP - General (Nurse Practitioner)        Subjective     Postop day 2 from ORIF right ankle, now painful as her block is worn off.  Also has right proximal humerus fracture I am not treating her for      Objective     Vital Signs  Vitals:    11/19/22 0413 11/19/22 2004 11/20/22 0354 11/20/22 1121   BP: 128/79 92/60 133/76 (!) 89/57   BP Location: Left arm Left arm Left arm Left arm   Patient Position: Lying Lying Lying Lying   Pulse: 95 86 95 95   Resp: 16 16 16 16   Temp: 97.6 °F (36.4 °C) 97.4 °F (36.3 °C) 97.5 °F (36.4 °C) 97.8 °F (36.6 °C)   TempSrc: Oral Oral Oral Oral   SpO2: 95% 96% 94% 96%   Weight:       Height:           Physical Exam:   White female, no pain stress, alert and orient x3 sitting up in bed  Right ankle splinted.  Able to feel toes with 4/5 dorsiflexion plantarflexion great toe       Results Review:     I reviewed the patient's new clinical results.  I reviewed the patient's new imaging results    Lab Results (last 24 hours)     Procedure Component Value Units Date/Time    CBC & Differential [[id number]]  (Abnormal) Collected: 11/20/22 0114    Specimen: Blood Updated: 11/20/22 0229    Narrative:      The following orders were created for panel order CBC & Differential.  Procedure                               Abnormality         Status                     ---------                               -----------         ------                     CBC Auto Differential[[id number]]        Abnormal            Final result                 Please view results for these tests on the individual orders.    CBC Auto Differential [[id number]]  (Abnormal) Collected: 11/20/22 0114    Specimen: Blood Updated: 11/20/22 0229     WBC 6.90 10*3/mm3      RBC 3.67 10*6/mm3      Hemoglobin 10.6 g/dL      Hematocrit 33.0 %      MCV 89.9 fL      MCH 29.0 pg      MCHC 32.2 g/dL      RDW 15.3 %      RDW-SD 48.1 fl      MPV 9.2 fL      Platelets 336 10*3/mm3       Comment: Result checked          Neutrophil % 64.7 %      Lymphocyte % 27.3 %      Monocyte % 7.6 %      Eosinophil % 0.3 %      Basophil % 0.1 %      Neutrophils, Absolute 4.50 10*3/mm3      Lymphocytes, Absolute 1.90 10*3/mm3      Monocytes, Absolute 0.50 10*3/mm3      Eosinophils, Absolute 0.00 10*3/mm3      Basophils, Absolute 0.00 10*3/mm3      nRBC 0.0 /100 WBC     Renal Function Panel [[id number]]  (Abnormal) Collected: 11/20/22 0114    Specimen: Blood Updated: 11/20/22 0227     Glucose 95 mg/dL      BUN 23 mg/dL      Creatinine 1.50 mg/dL      Sodium 139 mmol/L      Potassium 4.1 mmol/L      Chloride 103 mmol/L      CO2 18.0 mmol/L      Calcium 8.8 mg/dL      Albumin 3.60 g/dL      Phosphorus 2.4 mg/dL      Anion Gap 18.0 mmol/L      BUN/Creatinine Ratio 15.3     eGFR 37.8 mL/min/1.73      Comment: National Kidney Foundation and American Society of Nephrology (ASN) Task Force recommended calculation based on the Chronic Kidney Disease Epidemiology Collaboration (CKD-EPI) equation refit without adjustment for race.       Narrative:      GFR Normal >60  Chronic Kidney Disease <60  Kidney Failure <15      Hemoglobin A1c [[id number]] Collected: 11/19/22 0239    Specimen: Blood Updated: 11/19/22 1303        Imaging Results (Last 24 Hours)     ** No results found for the last 24 hours. **            Medication Review:   Scheduled Meds:aspirin, 325 mg, Oral, Daily  polyethylene glycol, 17 g, Oral, Daily  senna-docusate sodium, 2 tablet, Oral, BID  sodium chloride, 10 mL, Intravenous, Q12H      Continuous Infusions:   PRN Meds:.•  acetaminophen  •  senna-docusate sodium **AND** polyethylene glycol **AND** bisacodyl **AND** bisacodyl  •  HYDROcodone-acetaminophen  •  melatonin  •  Morphine **AND** naloxone  •  ondansetron **OR** ondansetron  •  oxyCODONE  •  oxyCODONE  •  potassium & sodium phosphates **OR** potassium & sodium phosphates  •  sodium chloride  •  [COMPLETED] Insert Peripheral IV **AND** sodium  chloride  •  sodium chloride  •  sodium chloride      Assessment & Plan     Stable with right proximal humerus fracture and ORIF of right trimalleolar ankle fracture    Plan for disposition: Patient will likely need rehab placement.  Continue toe-touch weight-bear for 6 weeks.  Return 10 days postop for staple removal and placement in a boot I can see her for the shoulder if she wishes me to.    Taran Marie MD  11/20/22  12:18 EST

## 2022-11-20 NOTE — PROGRESS NOTES
[facility] Medicine Services        Patient Name: Kathe Burt  : 1955  MRN: [id number]  Primary Care Physician:  Provider, No Known  Date of admission: 2022        Subjective       Chief Complaint: Right ankle pain following a fall     History of Present Illness: Kathe Burt is a 67 y.o. female with a past medical history of a CVA approximately 18 years ago with residual right-sided weakness who presented to [facility] on 2022 complaining of right ankle pain following a fall.  She states that she was using the restroom and when she stood up she lost her balance and fell to the ground landing on her right side.  She was unable to get up from the fall.  Her family found her approximately 2 hours later and called EMS.  She denies hitting her head or any loss of consciousness.  She describes her pain is located in her right lateral heel, at throbbing and she rates it currently a 9 out of 10.  She denies any weakness or dizziness prior to the fall, she reports that she did not take any pain medication prior to the fall.  Chest pain, shortness of breath.  She does state that this is her second fall recently.  On 2022 she fell out of bed and sustained a right proximal humeral neck fracture.  Her arm was placed in a sling and and she released from the [facility] ED to follow-up with orthopedic MD outpatient.  She denies sustaining any new injury to her arm in the most recent fall.     In the ED, an x-ray of her right ankle shows a comminuted trimalleolar fracture dislocation of the ankle.  There is a large amount of soft tissue swelling extending from the ankle dislocation.  There is irregularity at the medial aspect of the navicular bone which may represent fracture or secondary ossification center.  Creatinine is 1.06, WBC 11.2, CK is 64, CK-MB less than 1.  All other labs are unremarkable.  She is afebrile, all vital signs are stable.  Her right  extremity was placed in a posterior splint and stirrup plaster splint in the ED.  Orthopedic surgery was consulted and plan to take patient to the OR tomorrow afternoon.  Hospitalist was consulted for further care and management.   11/18/2022; patient is lying in the bed, still complaining of a lot of pain right lower extremity hemodynamically stable patient to go for ORIF this afternoon.  11/19/2022; patient is sitting on easy chair at the bedside, pain is controlled, hemodynamically stable, PT OT recommending subacute rehab await placement,   11/20/2022; afebrile vital signs are stable, complaining of increased pain in the right lower extremity but controlled with pain medication, hemodynamically stable, no bowel movement yet, continue stool softeners, needs placement in subacute rehab.,  Acute kidney injury nephrology following, renal ultrasound pending.  Review of Systems   Constitutional: Negative.   HENT: Negative.    Eyes: Negative.    Cardiovascular: Negative.    Respiratory: Negative.    Hematologic/Lymphatic: Negative.    Skin: Negative.    Musculoskeletal: Positive for falls and joint pain (Right ankle).   Gastrointestinal: Negative.    Genitourinary: Negative.    Neurological: Negative.    Psychiatric/Behavioral: Negative.    Allergic/Immunologic: Negative.          Personal History      Medical History        Past Medical History:   Diagnosis Date   • Stroke (HCC)              Surgical History   History reviewed. No pertinent surgical history.        Family History: family history is not on file. Otherwise pertinent FHx was reviewed and not pertinent to current issue.     Social History:       Home Medications:           Prior to Admission Medications      Prescriptions Last Dose Informant Patient Reported? Taking?     HYDROcodone-acetaminophen (NORCO) 5-325 MG per tablet     No No     Take 1 tablet by mouth Every 6 (Six) Hours As Needed for Moderate Pain.                Allergies:  No Known Allergies      Objective       Vitals:   Temp:  [97.4 °F (36.3 °C)-97.5 °F (36.4 °C)] 97.5 °F (36.4 °C)  Heart Rate:  [86-95] 95  Resp:  [16] 16  BP: ()/(60-76) 133/76     Physical Exam  Vitals and nursing note reviewed.   Constitutional:       Appearance: Normal appearance.   HENT:      Head: Normocephalic and atraumatic.      Nose: Nose normal.      Mouth/Throat:      Mouth: Mucous membranes are moist.   Eyes:      Extraocular Movements: Extraocular movements intact.      Pupils: Pupils are equal, round, and reactive to light.   Cardiovascular:      Rate and Rhythm: Normal rate and regular rhythm.      Pulses: Normal pulses.      Heart sounds: Normal heart sounds.   Pulmonary:      Effort: Pulmonary effort is normal.      Breath sounds: Normal breath sounds.   Abdominal:      General: Bowel sounds are normal.      Palpations: Abdomen is soft.   Musculoskeletal:         General: Normal range of motion.      Right upper arm: Tenderness (Right arm in a sling due to previous fracture) present.      Cervical back: Normal range of motion.      Right lower leg: Right lower leg covered in Ace wrap.     Comments: Capillary refill less than 2 on right lower extremity, sensation is intact   Skin:     General: Skin is warm and dry.   Neurological:      General: No focal deficit present.      Mental Status: She is alert and oriented to person, place, and time. Mental status is at baseline.   Psychiatric:         Mood and Affect: Mood normal.         Behavior: Behavior normal.         Result Review    Result Review:  I have personally reviewed the results from the time of this admission to 11/18/2022 00:57 EST and agree with these findings:  [x]?  Laboratory  []?  Microbiology  [x]?  Radiology  []?  EKG/Telemetry   []?  Cardiology/Vascular   []?  Pathology  []?  Old records  []?  Other:  Most notable findings include: As above  Lab Results   Component Value Date    GLUCOSE 95 11/20/2022    CALCIUM 8.8 11/20/2022     11/20/2022     K 4.1 11/20/2022    CO2 18.0 (L) 11/20/2022     11/20/2022    BUN 23 11/20/2022    CREATININE 1.50 (H) 11/20/2022    BCR 15.3 11/20/2022    ANIONGAP 18.0 (H) 11/20/2022     Lab Results   Component Value Date    WBC 6.90 11/20/2022    HGB 10.6 (L) 11/20/2022    HCT 33.0 (L) 11/20/2022    MCV 89.9 11/20/2022     11/20/2022        Assessment & Plan          Active Hospital Problems:       Active Hospital Problems     Diagnosis     • **Trimalleolar fracture of ankle, closed, right, initial encounter     • Fall, initial encounter     • History of CVA (cerebrovascular accident)     • Right sided weakness due to previous CVA        Plan:      Trimalleolar fracture of ankle  -Ankle x-ray reviewed  -RLE placed in posterior splint and stirrup plaster splint in ED  -Orthopedic surgeon consult appreciated and following  -S/p ORIF right ankle on 11/18/2022.  -Pain medication ordered as needed  -Patient will need PT/OT following surgery due to multiple recent falls with injuries   -Case management consult for possible rehab placement due to multiple recent falls with injury  - Toe-touch weight-bear for 6 weeks.  Likely need rehab placement   Constipation; stool softeners.  Patient has a previous history of a CVA approximately 18 years ago  -Residual right-sided weakness noted  -Patient reports no home medications   Acute blood loss anemia; hemoglobin 12.6 on arrival currently 10.6 continue to monitor.   Acute kidney injury; most likely prerenal serum creatinine on admission 1.4, up to 1.59 today patient received IV fluid, nephrology consulted and following.  Renal ultrasound pending   Hyperglycemia; stress/reactive check A1c.  Disposition; PT OT eval, await placement in subacute rehab.  Renal ultrasound pending.  DVT prophylaxis:  Mechanical DVT prophylaxis orders are present.     CODE STATUS:    Code Status (Patient has no pulse and is not breathing): CPR (Attempt to Resuscitate)  Medical Interventions (Patient  has pulse or is breathing): Full Support     Admission Status:  I believe this patient meets inpatient status.     I discussed the patient's findings and my recommendations with patient.     This patient has been examined wearing appropriate Personal Protective Equipment.      Electronically signed by Theresa Bryant MD, 11/20/22, 10:38 AM EST.

## 2022-11-21 LAB
ALBUMIN SERPL-MCNC: 3.4 G/DL (ref 3.5–5.2)
ANION GAP SERPL CALCULATED.3IONS-SCNC: 9 MMOL/L (ref 5–15)
BILIRUB UR QL STRIP: NEGATIVE
BUN SERPL-MCNC: 17 MG/DL (ref 8–23)
BUN/CREAT SERPL: 18.1 (ref 7–25)
CALCIUM SPEC-SCNC: 7.9 MG/DL (ref 8.6–10.5)
CHLORIDE SERPL-SCNC: 107 MMOL/L (ref 98–107)
CLARITY UR: CLEAR
CO2 SERPL-SCNC: 26 MMOL/L (ref 22–29)
COLOR UR: YELLOW
CREAT SERPL-MCNC: 0.94 MG/DL (ref 0.57–1)
EGFRCR SERPLBLD CKD-EPI 2021: 66.2 ML/MIN/1.73
GLUCOSE SERPL-MCNC: 115 MG/DL (ref 65–99)
GLUCOSE UR STRIP-MCNC: NEGATIVE MG/DL
HBA1C MFR BLD: 5.3 % (ref 3.5–5.6)
HGB UR QL STRIP.AUTO: NEGATIVE
KETONES UR QL STRIP: NEGATIVE
LEUKOCYTE ESTERASE UR QL STRIP.AUTO: NEGATIVE
NITRITE UR QL STRIP: NEGATIVE
PH UR STRIP.AUTO: 5.5 [PH] (ref 5–8)
PHOSPHATE SERPL-MCNC: 2.4 MG/DL (ref 2.5–4.5)
POTASSIUM SERPL-SCNC: 4.1 MMOL/L (ref 3.5–5.2)
PROT UR QL STRIP: NEGATIVE
SODIUM SERPL-SCNC: 142 MMOL/L (ref 136–145)
SP GR UR STRIP: 1.01 (ref 1–1.03)
UROBILINOGEN UR QL STRIP: NORMAL

## 2022-11-21 PROCEDURE — 81003 URINALYSIS AUTO W/O SCOPE: CPT | Performed by: INTERNAL MEDICINE

## 2022-11-21 PROCEDURE — 97110 THERAPEUTIC EXERCISES: CPT

## 2022-11-21 PROCEDURE — 80069 RENAL FUNCTION PANEL: CPT | Performed by: HOSPITALIST

## 2022-11-21 PROCEDURE — 97530 THERAPEUTIC ACTIVITIES: CPT

## 2022-11-21 RX ADMIN — Medication 10 ML: at 21:00

## 2022-11-21 RX ADMIN — HYDROCODONE BITARTRATE AND ACETAMINOPHEN 1 TABLET: 7.5; 325 TABLET ORAL at 21:05

## 2022-11-21 RX ADMIN — Medication 10 ML: at 08:07

## 2022-11-21 RX ADMIN — SENNOSIDES AND DOCUSATE SODIUM 2 TABLET: 50; 8.6 TABLET ORAL at 08:07

## 2022-11-21 RX ADMIN — SENNOSIDES AND DOCUSATE SODIUM 2 TABLET: 50; 8.6 TABLET ORAL at 21:00

## 2022-11-21 RX ADMIN — Medication 5 MG: at 21:00

## 2022-11-21 RX ADMIN — Medication 2 PACKET: at 21:00

## 2022-11-21 RX ADMIN — POLYETHYLENE GLYCOL 3350 17 G: 17 POWDER, FOR SOLUTION ORAL at 08:07

## 2022-11-21 RX ADMIN — ASPIRIN 325 MG ORAL TABLET 325 MG: 325 PILL ORAL at 08:07

## 2022-11-21 NOTE — PLAN OF CARE
"Goal Outcome Evaluation:     Bed mobility - Mod-A and Assist x 2 supine to sit  Transfers - Mod-A and Assist x 2 sit to stand pivot  Ambulation -  N/A    WB'ing status: R Lower Extremity Toe Touch Weight Bearing  R Upper Extremity Non weight bearing    Therapeutic Exercise: 10 Reps Bilaterally AROM LAQ's BLE's, ap and sitting hip flexion to LLE      Moderate Intensity Therapy recommended post-acute care. This is recommended as therapy feels the patient would require 3-4 days per week and wouldn't tolerate \"3 hour daily\" rehab intensity. SNF rehab would be the preferred choice.  Pt requires no DME at discharge.     Pt desires Skilled Rehab placement at discharge. Pt cooperative; agreeable to therapeutic recommendations and plan of care.       "

## 2022-11-21 NOTE — PROGRESS NOTES
"[facility] PROGRESS NOTE     LOS: 1 day    Patient Care Team:  Gaiv Calvert APRN as PCP - General (Nurse Practitioner)      Subjective     Patient resting comfortably.  Denies any pain, chest pain, shortness of breath, nausea or vomiting    Objective     Vital Sign Min/Max for last 24 hours  Temp:  [97.4 °F (36.3 °C)-97.6 °F (36.4 °C)] 97.4 °F (36.3 °C)  Heart Rate:  [72-93] 93  Resp:  [18-19] 18  BP: (107-136)/(69-82) 136/80                       Flowsheet Rows    Flowsheet Row First Filed Value   Admission Height 152.4 cm (60\") Documented at 11/17/2022 1924   Admission Weight 71.7 kg (158 lb) Documented at 11/17/2022 1924          No intake/output data recorded.  I/O last 3 completed shifts:  In: 840 [P.O.:840]  Out: 1100 [Urine:1100]    Physical Exam:  Physical Exam    General Appearance: alert, oriented x 3, no acute distress   Skin: warm and dry  HEENT: oral mucosa normal, nonicteric sclera  Neck: supple, no JVD  Lungs: CTA  Heart: RRR, normal S1 and S2  Abdomen: soft, nontender, nondistended  : no palpable bladder  Extremities: no edema, cyanosis or clubbing  Neuro: normal speech and mental status        LABS:  Lab Results   Component Value Date    CALCIUM 8.8 11/20/2022    PHOS 3.4 11/20/2022     Results from last 7 days   Lab Units 11/20/22  0114 11/19/22  0239 11/18/22  0510 11/17/22 2024   SODIUM mmol/L 139 138 140 140   POTASSIUM mmol/L 4.1 4.5 4.2 4.0   CHLORIDE mmol/L 103 105 103 105   CO2 mmol/L 18.0* 16.0* 20.0* 23.0   BUN mg/dL 23 19 17 14   CREATININE mg/dL 1.50* 1.59* 1.48* 1.06*   GLUCOSE mg/dL 95 122* 103* 110*   CALCIUM mg/dL 8.8 8.6 9.2 9.7   WBC 10*3/mm3 6.90 6.30 6.80 11.20*   HEMOGLOBIN g/dL 10.6* 11.0* 11.3* 12.6   PLATELETS 10*3/mm3 336 180 348 361   ALT (SGPT) U/L  --   --   --  16   AST (SGOT) U/L  --   --   --  16     Lab Results   Component Value Date    CKTOTAL 64 11/17/2022    CKMB <1.00 11/17/2022     Estimated Creatinine Clearance: 33.2 mL/min (A) (by C-G formula based on " SCr of 1.5 mg/dL (H)).      Brief Urine Lab Results  (Last result in the past 365 days)      Color   Clarity   Blood   Leuk Est   Nitrite   Protein   CREAT   Urine HCG        11/21/22 0250 Yellow   Clear   Negative   Negative   Negative   Negative               WEIGHTS:     Wt Readings from Last 1 Encounters:   11/21/22 0334 78 kg (171 lb 15.3 oz)   11/18/22 0254 74.1 kg (163 lb 5.8 oz)   11/18/22 0122 74.1 kg (163 lb 5.8 oz)   11/17/22 1924 71.7 kg (158 lb)       aspirin, 325 mg, Oral, Daily  polyethylene glycol, 17 g, Oral, Daily  senna-docusate sodium, 2 tablet, Oral, BID  sodium chloride, 10 mL, Intravenous, Q12H           Assessment & Plan       1. Acute kidney injury.  Nonoliguric ATN due to dehydration and hypotension.  Creatinine stable around 1.5 Mg/DL.  Electrolytes, volume status okay .  Patient is nonoliguric  2.  Right trimalleolar ankle fracture.  S/p ORIF     Plan:  Resume gentle IV hydration  Repeat urinalysis      Ramin Pickard MD  11/21/22  13:06 EST

## 2022-11-21 NOTE — PROGRESS NOTES
LOS: 1 day   Patient Care Team:  Gavi Calvert APRN as PCP - General (Nurse Practitioner)        Subjective     Postop day 2 from ORIF right ankle, not able to herself up or walk      Objective     Vital Signs  Vitals:    11/20/22 2100 11/21/22 0008 11/21/22 0334 11/21/22 0813   BP: 107/69 135/81 127/82 136/80   BP Location: Left arm Left arm Left arm    Patient Position: Lying Lying Lying    Pulse: 72 93 91 93   Resp: 18 18 19 18   Temp: 97.6 °F (36.4 °C) 97.6 °F (36.4 °C) 97.6 °F (36.4 °C) 97.4 °F (36.3 °C)   TempSrc: Oral Oral Oral Oral   SpO2: 95% 94% 96% 93%   Weight:   78 kg (171 lb 15.3 oz)    Height:           Physical Exam:   Alert and orient x3 sitting in chair  Splint intact moving toes well     Results Review:     I reviewed the patient's new clinical results.  I reviewed the patient's new imaging results    Lab Results (last 24 hours)     Procedure Component Value Units Date/Time    Hemoglobin A1c [[id number]]  (Normal) Collected: 11/19/22 0239    Specimen: Blood Updated: 11/21/22 1054     Hemoglobin A1C 5.3 %     Narrative:      Hemoglobin A1C Reference Range:    <5.7 %        Normal  5.7-6.4 %     Increased risk for diabetes  > 6.4 %        Diabetes       These guidelines have been recommended by the American Diabetic Association for Hgb A1c.      The following 2010 guidelines have been recommended by the American Diabetes Association for Hemoglobin A1c.    HBA1c 5.7-6.4% Increased risk for future diabetes (pre-diabetes)  HBA1c     >6.4% Diabetes      Urinalysis With Culture If Indicated - Urine, Clean Catch [[id number]]  (Normal) Collected: 11/21/22 0250    Specimen: Urine, Clean Catch Updated: 11/21/22 0331     Color, UA Yellow     Appearance, UA Clear     pH, UA 5.5     Specific Gravity, UA 1.014     Glucose, UA Negative     Ketones, UA Negative     Bilirubin, UA Negative     Blood, UA Negative     Protein, UA Negative     Leuk Esterase, UA Negative     Nitrite, UA Negative     Urobilinogen,  UA 0.2 E.U./dL    Narrative:      In absence of clinical symptoms, the presence of pyuria, bacteria, and/or nitrites on the urinalysis result does not correlate with infection.  Urine microscopic not indicated.    Phosphorus [[id number]]  (Normal) Collected: 11/20/22 1939    Specimen: Blood Updated: 11/20/22 2016     Phosphorus 3.4 mg/dL      Comment: Result checked             Imaging Results (Last 24 Hours)     Procedure Component Value Units Date/Time    US Renal Bilateral [[id number]] Collected: 11/21/22 0742     Updated: 11/21/22 0746    Narrative:      Examination: US RENAL BILATERAL-     Date of Exam: 11/20/2022 11:18 PM     Indication: jhonny; W19.XXXA-Unspecified fall, initial encounter;  M25.571-Pain in right ankle and joints of right foot; M79.671-Pain in  right foot.     Comparison: None available.     Technique: Grayscale and color Doppler ultrasound evaluation of the  kidneys and urinary bladder was performed     Findings:     Study somewhat limited by body habitus.        The right kidney measures 10.3 x 5.0 x 5.1 cm and the left kidney  measures 9.7 x 4.0 x 4.5 cm.     Simple appearing 1.9 x 1.8 x 2.2 cm cyst off the inferior pole right  kidney. There is no hydronephrosis. Mild bilateral cortical thinning  noted. Echogenicity is unremarkable.     Limited visualization of the urinary bladder is unremarkable.       Impression:         1. No hydronephrosis  2. Mild bilateral cortical thinning  3. Bladder is grossly unremarkable in appearance     Electronically Signed By-Osmani Oliver On:11/21/2022 7:43 AM  This report was finalized on [id number] by  Osmani Oliver, .            Medication Review:   Scheduled Meds:aspirin, 325 mg, Oral, Daily  polyethylene glycol, 17 g, Oral, Daily  senna-docusate sodium, 2 tablet, Oral, BID  sodium chloride, 10 mL, Intravenous, Q12H      Continuous Infusions:   PRN Meds:.•  acetaminophen  •  senna-docusate sodium **AND** polyethylene glycol **AND** bisacodyl **AND**  bisacodyl  •  HYDROcodone-acetaminophen  •  melatonin  •  Morphine **AND** naloxone  •  ondansetron **OR** ondansetron  •  oxyCODONE  •  oxyCODONE  •  potassium & sodium phosphates **OR** potassium & sodium phosphates  •  sodium chloride  •  [COMPLETED] Insert Peripheral IV **AND** sodium chloride  •  sodium chloride  •  sodium chloride      Assessment & Plan     Stable with right ankle fracture    Plan for disposition: Toe-touch weight-bear for 6 weeks postop.  Will need rehab placement.  Return to see 10 to 14 days postop for staple removal    Taran Marie MD  11/21/22  12:21 EST

## 2022-11-21 NOTE — PROGRESS NOTES
Seen and examined  Has mild pain    Working with pt    Review of Systems   All other systems reviewed and are negative.    Temp:  [97.4 °F (36.3 °C)-97.6 °F (36.4 °C)] 97.4 °F (36.3 °C)  Heart Rate:  [72-93] 93  Resp:  [18-19] 18  BP: (107-136)/(69-82) 136/80  I/O last 3 completed shifts:  In: 840 [P.O.:840]  Out: 1100 [Urine:1100]  I/O this shift:  In: 360 [P.O.:360]  Out: 500 [Urine:500]    Physical Exam  Constitutional:       Appearance: Normal appearance.   Eyes:      Pupils: Pupils are equal, round, and reactive to light.   Cardiovascular:      Rate and Rhythm: Normal rate and regular rhythm.      Pulses: Normal pulses.   Pulmonary:      Effort: Pulmonary effort is normal.   Neurological:      Mental Status: She is alert.           Trimalleolar fracture of ankle, closed, right, initial encounter    Fall, initial encounter    History of CVA (cerebrovascular accident)    Right sided weakness due to previous CVA    Trimalleolar fracture of ankle  -Ankle x-ray reviewed  -RLE placed in posterior splint and stirrup plaster splint in ED  -Orthopedic surgeon consult appreciated and following  -S/p ORIF right ankle on 11/18/2022.  -Pain medication ordered as needed  -Patient will need PT/OT following surgery due to multiple recent falls with injuries   -Case management consult for possible rehab placement due to multiple recent falls with injury  - Toe-touch weight-bear for 6 weeks.  Likely need rehab placement   Constipation; stool softeners.  Patient has a previous history of a CVA approximately 18 years ago  -Residual right-sided weakness noted  -Patient reports no home medications   Acute blood loss anemia; hemoglobin 12.6 on arrival currently 10.6 continue to monitor.   Acute kidney injury; most likely prerenal serum creatinine on admission 1.4, up to 1.59 today patient received IV fluid, nephrology consulted and following.  Renal ultrasound pending   Hyperglycemia; stress/reactive check A1c.  Disposition; PT OT  eval, await placement in subacute rehab.  Renal ultrasound pending.  DVT prophylaxis:  Mechanical DVT prophylaxis orders are present.

## 2022-11-21 NOTE — THERAPY TREATMENT NOTE
"Subjective: Pt agreeable to therapeutic plan of care.    Objective:     Bed mobility - Mod-A and Assist x 2 supine to sit  Transfers - Mod-A and Assist x 2 sit to stand pivot  Ambulation -  N/A    WB'ing status: R Lower Extremity Toe Touch Weight Bearing  R Upper Extremity Non weight bearing    Therapeutic Exercise: 10 Reps Bilaterally AROM LAQ's BLE's, ap and sitting hip flexion to LLE    Precautions: None and Weight-bearing restriction  Pt is a high fall risk      Vitals: WNL  Pt on room air.  Sats 95%    Pain: 4 VAS   Location: RUE  Intervention for pain: Repositioned and RN notified    Education: Provided education on the importance of mobility in the acute care setting, Transfer Training and WB'ing status    Assessment: Kathe Burt presents with functional mobility impairments which indicate the need for skilled intervention. Tolerating session today without incident. Pt was able to stand a little on LLE in order to pivot to the bedside chair. Pt is not safe to return home at this time.  Pt will need continued nursing care and PT/OT services at SNF rehab. Will continue to follow and progress as tolerated.     Plan/Recommendations:   Moderate Intensity Therapy recommended post-acute care. This is recommended as therapy feels the patient would require 3-4 days per week and wouldn't tolerate \"3 hour daily\" rehab intensity. SNF rehab would be the preferred choice.  Pt requires no DME at discharge.     Pt desires Skilled Rehab placement at discharge. Pt cooperative; agreeable to therapeutic recommendations and plan of care.     Basic Mobility 6-click:  Rollin = Total, A lot = 2, A little = 3; 4 = None  Supine>Sit:   1 = Total, A lot = 2, A little = 3; 4 = None   Sit>Stand with arms:  1 = Total, A lot = 2, A little = 3; 4 = None  Bed>Chair:   1 = Total, A lot = 2, A little = 3; 4 = None  Ambulate in room:  1 = Total, A lot = 2, A little = 3; 4 = None  3-5 Steps with railin = Total, A lot = 2, A " little = 3; 4 = None  Score: 9    Modified [place]: 5 = Severe disability (Requires constant nursing care and attention, bedridden, incontinent)    Post-Tx Position: Up in Chair, Alarms activated and Call light and personal items within reach  PPE: gloves and surgical mask

## 2022-11-21 NOTE — PLAN OF CARE
Goal Outcome Evaluation:   Patient worked with PT today and is now resting in bed. No complaints of pain this shift. Will continue with plan of care.

## 2022-11-21 NOTE — PLAN OF CARE
"Goal Outcome Evaluation:  Plan of Care Reviewed With: patient, spouse     Assessment: Kathe Burt presents with ADL impairments below baseline abilities which indicate the need for continued skilled intervention while inpatient. Improved transfer skill this date. 2-person mod (A). Dependent to bathe rt hand, arm, & axilla, to remove and don sling, and for toileting.Tolerating session today without incident. Will continue to follow and progress as tolerated.      Plan/Recommendations:   Moderate Intensity Therapy recommended post-acute care. This is recommended as therapy feels the patient would require 3-4 days per week and wouldn't tolerate \"3 hour daily\" rehab intensity. SNF would be the preferred choice. If the patient does not agree to SNF, arrange HH or OP depending on home bound status. If patient is medically complex, consider LTACH.. Pt requires no DME at discharge.      Pt desires Skilled Rehab placement at discharge. Pt cooperative; agreeable to therapeutic recommendations and plan of care.   "

## 2022-11-21 NOTE — DISCHARGE PLACEMENT REQUEST
"Kathe Bazan (67 y.o. Female)     Date of Birth   1955    Social Security Number       Address   [address] [place] [address] [place] [facility]    Home Phone   [phone]    MRN   [id number]       Adventism   None    Marital Status                               Admission Date   11/17/22    Admission Type   Emergency    Admitting Provider       Attending Provider   Jamel Amaro MD    Department, Room/Bed   [facility] 3C MEDICAL INPATIENT, 367/1       Discharge Date       Discharge Disposition       Discharge Destination                               Attending Provider: Jamel Amaro MD    Allergies: No Known Allergies    Isolation: None   Infection: None   Code Status: CPR    Ht: 152.4 cm (60\")   Wt: 78 kg (171 lb 15.3 oz)    Admission Cmt: None   Principal Problem: Trimalleolar fracture of ankle, closed, right, initial encounter [S82.851A]                 Active Insurance as of 11/17/2022     Primary Coverage     Payor Plan Insurance Group Employer/Plan Group    HUMANA MEDICARE REPLACEMENT HU[employer]NA MEDICARE REPLACEMENT [id number]     Payor Plan Address Payor Plan Phone Number Payor Plan Fax Number Effective Dates    [address] [address] [phone]  10/1/2022 - None Entered    Former[facility] [id number]       Subscriber Name Subscriber Birth Date Member ID       KATHE BAZAN 1955 [id number]                 Emergency Contacts      (Rel.) Home Phone Work Phone Mobile Phone    RACHEL BAZAN (Spouse) -- -- [phone]        "

## 2022-11-21 NOTE — THERAPY TREATMENT NOTE
"Subjective: Pt agreeable to therapeutic plan of care.  Cognition: arousal/alertness: Alert and Attentive, safety/judgement: fair and awareness of deficits: fair awareness of safety precautions and fair awareness of deficits    Objective: using dia wick suction cath. NWB Rt shld, TTWB RLE    Bed Mobility: Max-A and Assist x 2   Functional Transfers: Mod-A and Assist x 2. Improved strength to assist w/ transfer. Decreased pain this date may also be helping her ability to assist with transfers.  Functional Ambulation: N/A or Not attempted.    Upper Body Dressing: Dependent  ADL Position: unsupported sitting  ADL Comments: sits forward in chair to remove sling and stretch RUE. Completed AROM  flex/ex; PROM wrist, elbow, shld extension.    Pain: 3 VAS  Location: rt arm  Interventions for pain: Repositioned, Increased Activity and Therapeutic Presence, PROM/AROM  Education: Provided education on the importance of mobility in the acute care setting, Verbal/Tactile Cues, ADL training, Transfer Training and WB'ing status    Assessment: Kathe Burt presents with ADL impairments below baseline abilities which indicate the need for continued skilled intervention while inpatient. Tolerating session today without incident. Will continue to follow and progress as tolerated.     Plan/Recommendations:   Moderate Intensity Therapy recommended post-acute care. This is recommended as therapy feels the patient would require 3-4 days per week and wouldn't tolerate \"3 hour daily\" rehab intensity. SNF would be the preferred choice. If the patient does not agree to SNF, arrange HH or OP depending on home bound status. If patient is medically complex, consider LTACH.. Pt requires no DME at discharge.     Pt desires Skilled Rehab placement at discharge. Pt cooperative; agreeable to therapeutic recommendations and plan of care.     Modified [place]: 5 = Severe disability (Requires constant nursing care and attention, bedridden, " incontinent)    Post-Tx Position: Supine with HOB Elevated, Alarms activated and Call light and personal items within reach  PPE: gloves, surgical mask and eye protection

## 2022-11-21 NOTE — PLAN OF CARE
Goal Outcome Evaluation:  Plan of Care Reviewed With: patient        Progress: no change  Outcome Evaluation: Patient has slept on and off tonight, no new complaints at this time, pt reminded to turn throuhogut the night, remains toe-touch weightbearing, rehab at d/c pending choices

## 2022-11-21 NOTE — CASE MANAGEMENT/SOCIAL WORK
Continued Stay Note  WILLARD Hunt     Patient Name: Kathe Burt  MRN: [id number]  Today's Date: 11/21/2022    Admit Date: 11/17/2022    Plan: Referral to [facility] pending. Will require precert. PASRR per facility.   Discharge Plan     Row Name 11/21/22 1429       Plan    Plan Referral to [facility] pending. Will require precert. PASRR per facility.    Patient/Family in Agreement with Plan yes    Plan Comments CM met with patient and  at bedside. Discussed SNF/rehab choices and obtained 1) [facility], 2) [facility], and 3) [facility]. Referral sent to [facility] and liaisons Shira/Corie. Acceptance is still pending at this time.              Met with patient in room wearing PPE: mask, face shield/goggles.      Maintained distance greater than six feet and spent less than 15 minutes in the room.      Megan Naegele, RN      Office Phone: [phone]  Office Cell: [phone]

## 2022-11-22 LAB
ALBUMIN SERPL-MCNC: 2.9 G/DL (ref 3.5–5.2)
ANION GAP SERPL CALCULATED.3IONS-SCNC: 8 MMOL/L (ref 5–15)
BUN SERPL-MCNC: 14 MG/DL (ref 8–23)
BUN/CREAT SERPL: 16.5 (ref 7–25)
CALCIUM SPEC-SCNC: 8.5 MG/DL (ref 8.6–10.5)
CHLORIDE SERPL-SCNC: 105 MMOL/L (ref 98–107)
CO2 SERPL-SCNC: 26 MMOL/L (ref 22–29)
CREAT SERPL-MCNC: 0.85 MG/DL (ref 0.57–1)
EGFRCR SERPLBLD CKD-EPI 2021: 74.7 ML/MIN/1.73
GLUCOSE SERPL-MCNC: 100 MG/DL (ref 65–99)
PHOSPHATE SERPL-MCNC: 3 MG/DL (ref 2.5–4.5)
POTASSIUM SERPL-SCNC: 4.1 MMOL/L (ref 3.5–5.2)
SODIUM SERPL-SCNC: 139 MMOL/L (ref 136–145)

## 2022-11-22 PROCEDURE — 94799 UNLISTED PULMONARY SVC/PX: CPT

## 2022-11-22 PROCEDURE — 80069 RENAL FUNCTION PANEL: CPT | Performed by: HOSPITALIST

## 2022-11-22 RX ADMIN — ACETAMINOPHEN 325 MG: 325 TABLET, FILM COATED ORAL at 17:17

## 2022-11-22 RX ADMIN — Medication 10 ML: at 09:03

## 2022-11-22 RX ADMIN — SENNOSIDES AND DOCUSATE SODIUM 2 TABLET: 50; 8.6 TABLET ORAL at 09:03

## 2022-11-22 RX ADMIN — ASPIRIN 325 MG ORAL TABLET 325 MG: 325 PILL ORAL at 09:03

## 2022-11-22 RX ADMIN — HYDROCODONE BITARTRATE AND ACETAMINOPHEN 1 TABLET: 7.5; 325 TABLET ORAL at 19:33

## 2022-11-22 RX ADMIN — Medication 10 ML: at 21:12

## 2022-11-22 RX ADMIN — POLYETHYLENE GLYCOL 3350 17 G: 17 POWDER, FOR SOLUTION ORAL at 09:03

## 2022-11-22 NOTE — PROGRESS NOTES
Seen and examined  pedning placement  improving    Review of Systems   All other systems reviewed and are negative.    Temp:  [97.8 °F (36.6 °C)] 97.8 °F (36.6 °C)  Heart Rate:  [78-96] 78  Resp:  [18] 18  BP: ()/(59-74) 112/74  I/O last 3 completed shifts:  In: 1320 [P.O.:1320]  Out: 1900 [Urine:1900]  No intake/output data recorded.    Physical Exam  Constitutional:       Appearance: Normal appearance.   Eyes:      Pupils: Pupils are equal, round, and reactive to light.   Cardiovascular:      Rate and Rhythm: Normal rate and regular rhythm.      Pulses: Normal pulses.   Pulmonary:      Effort: Pulmonary effort is normal.   Neurological:      Mental Status: She is alert.           Trimalleolar fracture of ankle, closed, right, initial encounter    Fall, initial encounter    History of CVA (cerebrovascular accident)    Right sided weakness due to previous CVA    Trimalleolar fracture of ankle  -Ankle x-ray reviewed  -RLE placed in posterior splint and stirrup plaster splint in ED  -Orthopedic surgeon consult appreciated and following  -S/p ORIF right ankle on 11/18/2022.  -Pain medication ordered as needed  -Patient will need PT/OT following surgery due to multiple recent falls with injuries   -Case management consult for possible rehab placement due to multiple recent falls with injury  - Toe-touch weight-bear for 6 weeks.  Likely need rehab placement   Constipation; stool softeners.  Patient has a previous history of a CVA approximately 18 years ago  -Residual right-sided weakness noted  -Patient reports no home medications   Acute blood loss anemia; hemoglobin 12.6 on arrival currently 10.6 continue to monitor.   Acute kidney injury; most likely prerenal serum creatinine on admission 1.4, up to 1.59 today patient received IV fluid, nephrology consulted and following.  Renal ultrasound pending   Hyperglycemia; stress/reactive check A1c.  Disposition; PT OT eval, await placement in subacute rehab.  Renal  ultrasound pending.  DVT prophylaxis:  Mechanical DVT prophylaxis orders are present.

## 2022-11-22 NOTE — PROGRESS NOTES
"[facility] PROGRESS NOTE     LOS: 3 days    Patient Care Team:  Gavi Calvert APRN as PCP - General (Nurse Practitioner)      Subjective     Patient resting comfortably.  Denies any pain, chest pain, shortness of breath, nausea or vomiting    Objective     Vital Sign Min/Max for last 24 hours  Temp:  [97.8 °F (36.6 °C)-98.9 °F (37.2 °C)] 98.9 °F (37.2 °C)  Heart Rate:  [78-96] 83  Resp:  [18-20] 20  BP: ()/(54-74) 96/54                       Flowsheet Rows    Flowsheet Row First Filed Value   Admission Height 152.4 cm (60\") Documented at 11/17/2022 1924   Admission Weight 71.7 kg (158 lb) Documented at 11/17/2022 1924          I/O this shift:  In: 120 [P.O.:120]  Out: 800 [Urine:800]  I/O last 3 completed shifts:  In: 1320 [P.O.:1320]  Out: 1900 [Urine:1900]    Physical Exam:  Physical Exam    General Appearance: alert, oriented x 3, no acute distress   Skin: warm and dry  HEENT: oral mucosa normal, nonicteric sclera  Neck: supple, no JVD  Lungs: CTA  Heart: RRR, normal S1 and S2  Abdomen: soft, nontender, nondistended  : no palpable bladder  Extremities: no edema, cyanosis or clubbing  Neuro: normal speech and mental status        LABS:  Lab Results   Component Value Date    CALCIUM 8.5 (L) 11/22/2022    PHOS 3.0 11/22/2022     Results from last 7 days   Lab Units 11/22/22  0239 11/21/22  1833 11/20/22  0114 11/19/22  0239 11/18/22  0510 11/17/22 2024   SODIUM mmol/L 139 142 139 138 140 140   POTASSIUM mmol/L 4.1 4.1 4.1 4.5 4.2 4.0   CHLORIDE mmol/L 105 107 103 105 103 105   CO2 mmol/L 26.0 26.0 18.0* 16.0* 20.0* 23.0   BUN mg/dL 14 17 23 19 17 14   CREATININE mg/dL 0.85 0.94 1.50* 1.59* 1.48* 1.06*   GLUCOSE mg/dL 100* 115* 95 122* 103* 110*   CALCIUM mg/dL 8.5* 7.9* 8.8 8.6 9.2 9.7   WBC 10*3/mm3  --   --  6.90 6.30 6.80 11.20*   HEMOGLOBIN g/dL  --   --  10.6* 11.0* 11.3* 12.6   PLATELETS 10*3/mm3  --   --  704 180 348 361   ALT (SGPT) U/L  --   --   --   --   --  16   AST (SGOT) U/L  --   --   " --   --   --  16     Lab Results   Component Value Date    CKTOTAL 64 11/17/2022    CKMB <1.00 11/17/2022     Estimated Creatinine Clearance: 58.5 mL/min (by C-G formula based on SCr of 0.85 mg/dL).      Brief Urine Lab Results  (Last result in the past 365 days)      Color   Clarity   Blood   Leuk Est   Nitrite   Protein   CREAT   Urine HCG        11/21/22 0250 Yellow   Clear   Negative   Negative   Negative   Negative               WEIGHTS:     Wt Readings from Last 1 Encounters:   11/21/22 0334 78 kg (171 lb 15.3 oz)   11/18/22 0254 74.1 kg (163 lb 5.8 oz)   11/18/22 0122 74.1 kg (163 lb 5.8 oz)   11/17/22 1924 71.7 kg (158 lb)       aspirin, 325 mg, Oral, Daily  polyethylene glycol, 17 g, Oral, Daily  senna-docusate sodium, 2 tablet, Oral, BID  sodium chloride, 10 mL, Intravenous, Q12H           Assessment & Plan       1. Acute kidney injury.  Nonoliguric ATN due to dehydration and hypotension.   Patient renal function improved.  Electrolytes, volume status okay  2.  Right trimalleolar ankle fracture.  S/p ORIF     Plan:  I will sign off.  Please call for any question      Ramin Pickard MD  11/22/22  11:53 EST

## 2022-11-22 NOTE — PAYOR COMM NOTE
"CLINICALS FOR PENDING INPATIENT PRECERT:        Kathe Bazan (67 y.o. Female) 1955  PENDING AUTH # [id number]          AUTHORIZATION PENDING:   PLEASE CALL OR FAX DETERMINATION TO CONTACT BELOW. THANK YOU.        Neli Palumbo, RN MSN  /UR  [facility]  [phone] office  [phone] fax  [email or web address]    [facility]  NPI: [id number]  Tax: [phone]            Kathe Bazan (67 y.o. Female)     Date of Birth   1955    Social Security Number       Address   [address] [place] [address] [place] [facility]    Home Phone   [phone]    MRN   [id number]       Hinduism   None    Marital Status                               Admission Date   11/17/22    Admission Type   Emergency    Admitting Provider       Attending Provider   Jamel Amaro MD    Department, Room/Bed   [facility] 3C MEDICAL INPATIENT, 367/1       Discharge Date       Discharge Disposition       Discharge Destination                               Attending Provider: Jamel Amaro MD    Allergies: No Known Allergies    Isolation: None   Infection: None   Code Status: CPR    Ht: 152.4 cm (60\")   Wt: 78 kg (171 lb 15.3 oz)    Admission Cmt: None   Principal Problem: Trimalleolar fracture of ankle, closed, right, initial encounter [S82.851A]                 Active Insurance as of 11/17/2022     Primary Coverage     Payor Plan Insurance Group Employer/Plan Group    HUMANA MEDICARE REPLACEMENT HUMANA MEDICARE REPLACEMENT [id number]     Payor Plan Address Payor Plan Phone Number Payor Plan Fax Number Effective Dates    [address] [address] [phone]  10/1/2022 - None Entered    [facility] [id number]       Subscriber Name Subscriber Birth Date Member ID       KATHE BAZAN 1955 [id number]                 Emergency Contacts      (Rel.) Home Phone Work Phone Mobile Phone    RACHEL BAZAN (Spouse) -- -- [phone]        11/17/22 9018  Initiate Observation Status  Once "     Completed     Level of Care: Med/Surg    Diagnosis: Fall, initial encounter [781973]    Admitting Physician: CARL SIMS [[id number]]    Attending Physician: LARRY ROTH [[id number]]            22 1618  Inpatient Admission  Once     Completed     Level of Care: Med/Surg    Diagnosis: Fall, initial encounter [[id number]]    Admitting Physician: CARL SIMS [[id number]]    Attending Physician: AYDEN SHEN [[id number]]    Certification: I Certify That Inpatient Hospital Services Are Medically Necessary For Greater Than 2 Midnights                     History & Physical      Kaitlin Ferrari APRN at 22 0044              [facility] Medicine Services      Patient Name: Kathe Burt  : 1955  MRN: [id number]  Primary Care Physician:  Provider, No Known  Date of admission: 2022      Subjective       Chief Complaint: Right ankle pain following a fall    History of Present Illness: Kathe Burt is a 67 y.o. female with a past medical history of a CVA approximately 18 years ago with residual right-sided weakness who presented to [facility] on 2022 complaining of right ankle pain following a fall.  She states that she was using the restroom and when she stood up she lost her balance and fell to the ground landing on her right side.  She was unable to get up from the fall.  Her family found her approximately 2 hours later and called EMS.  She denies hitting her head or any loss of consciousness.  She describes her pain is located in her right lateral heel, at throbbing and she rates it currently a 9 out of 10.  She denies any weakness or dizziness prior to the fall, she reports that she did not take any pain medication prior to the fall.  Chest pain, shortness of breath.  She does state that this is her second fall recently.  On 2022 she fell out of bed and sustained a right proximal humeral neck fracture.  Her arm was placed in a sling and and she released from  the [facility] ED to follow-up with orthopedic MD outpatient.  She denies sustaining any new injury to her arm in the most recent fall.    In the ED, an x-ray of her right ankle shows a comminuted trimalleolar fracture dislocation of the ankle.  There is a large amount of soft tissue swelling extending from the ankle dislocation.  There is irregularity at the medial aspect of the navicular bone which may represent fracture or secondary ossification center.  Creatinine is 1.06, WBC 11.2, CK is 64, CK-MB less than 1.  All other labs are unremarkable.  She is afebrile, all vital signs are stable.  Her right extremity was placed in a posterior splint and stirrup plaster splint in the ED.  Orthopedic surgery was consulted and plan to take patient to the OR tomorrow afternoon.  Hospitalist was consulted for further care and management.      Review of Systems   Constitutional: Negative.   HENT: Negative.    Eyes: Negative.    Cardiovascular: Negative.    Respiratory: Negative.    Hematologic/Lymphatic: Negative.    Skin: Negative.    Musculoskeletal: Positive for falls and joint pain (Right ankle).   Gastrointestinal: Negative.    Genitourinary: Negative.    Neurological: Negative.    Psychiatric/Behavioral: Negative.    Allergic/Immunologic: Negative.        Personal History     Past Medical History:   Diagnosis Date   • Stroke (HCC)        History reviewed. No pertinent surgical history.    Family History: family history is not on file. Otherwise pertinent FHx was reviewed and not pertinent to current issue.    Social History:      Home Medications:  Prior to Admission Medications     Prescriptions Last Dose Informant Patient Reported? Taking?    HYDROcodone-acetaminophen (NORCO) 5-325 MG per tablet   No No    Take 1 tablet by mouth Every 6 (Six) Hours As Needed for Moderate Pain.            Allergies:  No Known Allergies    Objective       Vitals:   Temp:  [98.6 °F (37 °C)] 98.6 °F (37 °C)  Heart Rate:   [79-85] 82  Resp:  [20] 20  BP: (118-160)/(55-89) 147/89    Physical Exam  Vitals and nursing note reviewed.   Constitutional:       Appearance: Normal appearance.   HENT:      Head: Normocephalic and atraumatic.      Nose: Nose normal.      Mouth/Throat:      Mouth: Mucous membranes are moist.   Eyes:      Extraocular Movements: Extraocular movements intact.      Pupils: Pupils are equal, round, and reactive to light.   Cardiovascular:      Rate and Rhythm: Normal rate and regular rhythm.      Pulses: Normal pulses.      Heart sounds: Normal heart sounds.   Pulmonary:      Effort: Pulmonary effort is normal.      Breath sounds: Normal breath sounds.   Abdominal:      General: Bowel sounds are normal.      Palpations: Abdomen is soft.   Musculoskeletal:         General: Normal range of motion.      Right upper arm: Tenderness (Right arm in a sling due to previous fracture) present.      Cervical back: Normal range of motion.      Right lower leg: Tenderness (Right lower extremity splinted due to fracture.) present.      Comments: Capillary refill less than 2 on right lower extremity, sensation is intact   Skin:     General: Skin is warm and dry.   Neurological:      General: No focal deficit present.      Mental Status: She is alert and oriented to person, place, and time. Mental status is at baseline.   Psychiatric:         Mood and Affect: Mood normal.         Behavior: Behavior normal.       Result Review    Result Review:  I have personally reviewed the results from the time of this admission to 11/18/2022 00:57 EST and agree with these findings:  [x]  Laboratory  []  Microbiology  [x]  Radiology  []  EKG/Telemetry   []  Cardiology/Vascular   []  Pathology  []  Old records  []  Other:  Most notable findings include: As above    Assessment & Plan        Active Hospital Problems:  Active Hospital Problems    Diagnosis    • **Trimalleolar fracture of ankle, closed, right, initial encounter    • Fall, initial  encounter    • History of CVA (cerebrovascular accident)    • Right sided weakness due to previous CVA      Plan:     Trimalleolar fracture of ankle  -Ankle x-ray reviewed  -RLE placed in posterior splint and stirrup plaster splint in ED  -Orthopedic surgeon consulted with plans for OR tomorrow afternoon  -Pain medication ordered as needed  -Patient will need PT/OT following surgery due to multiple recent falls with injuries   -Case management consult for possible rehab placement due to multiple recent falls with injury    Patient has a previous history of a CVA approximately 18 years ago  -Residual right-sided weakness noted  -Patient reports no home medications      DVT prophylaxis:  Mechanical DVT prophylaxis orders are present.    CODE STATUS:    Code Status (Patient has no pulse and is not breathing): CPR (Attempt to Resuscitate)  Medical Interventions (Patient has pulse or is breathing): Full Support    Admission Status:  I believe this patient meets inpatient status.    I discussed the patient's findings and my recommendations with patient.    This patient has been examined wearing appropriate Personal Protective Equipment 11/18/22      Signature: Electronically signed by Kaitlin Ferrari DNP, APRN, 11/18/22, 12:44 AM EST.    Electronically signed by Kaitlin Ferrari, APRN at 11/18/22 0059          Emergency Department Notes      Didi Willson, RN at 11/17/22 2014        Pt presents to ED via EMS c/o R ankle and foot pain after a fall at home today. Ankle is visibly swollen and bruised. Pt is currently in an R arm sling. Pt states that she fell about a week ago. Pt states that she has right sided paralysis due to a stroke 18 years ago     Electronically signed by Didi Willson RN at 11/17/22 2059     Echo Carver, APRN at 11/17/22 2017          Subjective   History of Present Illness  68 y/o white female with a PMHx of stroke with R-sided residual effects presents to the ED via EMS with cc of R  "foot pain onset 2 hours PTA. Pt states she was using the restroom, went to stand up, lost her balance and fell to the ground landing on her right side. Unable to get up from fall, family found her 2 hours later and called EMS. Denies hitting her head or any LOC. She is unsure of the mechanism of the fall but reports pain to the lateral heal. Describes pain as a throbbing sensation and rates it as an 8 out of 10. Denies feeling weak or dizzy prior to fall, R hip pain, and R knee pain. Pt states this is her 2 fall in the last 30 days. 11/2/2022 she fell out of bed and obtained a proximal R humeral neck fracture. States she will call to schedule follow up with ortho next week for humerus fracture. Denies taking any pain medications at this time as \"she is out of them.\"         Review of Systems   Constitutional: Negative for chills, fatigue and fever.   HENT: Negative for congestion, tinnitus and trouble swallowing.    Eyes: Negative for photophobia, discharge and redness.   Respiratory: Negative for cough and shortness of breath.    Cardiovascular: Negative for chest pain and palpitations.   Gastrointestinal: Negative for abdominal pain, diarrhea, nausea and vomiting.   Genitourinary: Negative for dysuria, frequency and urgency.   Musculoskeletal: Negative for back pain, joint swelling and myalgias.        Right foot and ankle pain   Skin: Negative for rash.   Neurological: Positive for weakness. Negative for dizziness, syncope, light-headedness and headaches.   Psychiatric/Behavioral: Negative for confusion.   All other systems reviewed and are negative.      History reviewed. No pertinent past medical history.    No Known Allergies    History reviewed. No pertinent surgical history.    History reviewed. No pertinent family history.    Social History     Socioeconomic History   • Marital status:            Objective   Physical Exam  Vitals reviewed.   Constitutional:       General: She is not in acute " distress.     Appearance: Normal appearance. She is well-developed and normal weight. She is not toxic-appearing.   HENT:      Head: Normocephalic and atraumatic.      Right Ear: External ear normal.      Left Ear: External ear normal.      Nose: Nose normal.      Mouth/Throat:      Mouth: Mucous membranes are moist.   Eyes:      Conjunctiva/sclera: Conjunctivae normal.      Pupils: Pupils are equal, round, and reactive to light.   Cardiovascular:      Rate and Rhythm: Normal rate and regular rhythm.      Pulses:           Dorsalis pedis pulses are 1+ on the right side and 1+ on the left side.        Posterior tibial pulses are 1+ on the right side and 1+ on the left side.      Heart sounds: Normal heart sounds.   Pulmonary:      Effort: Pulmonary effort is normal. No respiratory distress.      Breath sounds: Normal breath sounds. No wheezing.   Abdominal:      General: Bowel sounds are normal. There is no distension.      Palpations: Abdomen is soft. There is no mass.      Tenderness: There is no abdominal tenderness. There is no guarding or rebound.   Musculoskeletal:         General: No deformity. Normal range of motion.      Cervical back: Normal range of motion and neck supple.   Feet:      Right foot:      Skin integrity: Skin integrity normal.      Left foot:      Skin integrity: Skin integrity normal.   Skin:     General: Skin is warm and dry.      Capillary Refill: Capillary refill takes less than 2 seconds.   Neurological:      General: No focal deficit present.      Mental Status: She is alert and oriented to person, place, and time.      GCS: GCS eye subscore is 4. GCS verbal subscore is 5. GCS motor subscore is 6.      Cranial Nerves: No cranial nerve deficit.      Sensory: No sensory deficit.      Deep Tendon Reflexes: Reflexes normal.   Psychiatric:         Mood and Affect: Mood normal.         Behavior: Behavior normal.         Procedures      Posterior splint and sitrrup plaster splint placed on the  "right lower extremity.and the patient was distally NVS intact.     ED Course  ED Course as of 11/17/22 2153   Thu Nov 17, 2022 2115 Patient was discussed with Dr. Marie who states he will not be able to perform the surgery until late tomorrow afternoon and the patient does not need to be n.p.o. after midnight at this time [KW]      ED Course User Index  [KW] Echo Carver ANSELMO, APRN      /81 (BP Location: Right arm, Patient Position: Lying)   Pulse 85   Temp 98.6 °F (37 °C) (Oral)   Resp 20   Ht 152.4 cm (60\")   Wt 71.7 kg (158 lb)   SpO2 98%   BMI 30.86 kg/m²   Labs Reviewed   COMPREHENSIVE METABOLIC PANEL - Abnormal; Notable for the following components:       Result Value    Glucose 110 (*)     Creatinine 1.06 (*)     Alkaline Phosphatase 174 (*)     eGFR 57.3 (*)     All other components within normal limits    Narrative:     GFR Normal >60  Chronic Kidney Disease <60  Kidney Failure <15     CBC WITH AUTO DIFFERENTIAL - Abnormal; Notable for the following components:    WBC 11.20 (*)     Neutrophil % 88.4 (*)     Lymphocyte % 6.1 (*)     Monocyte % 4.9 (*)     Neutrophils, Absolute 9.90 (*)     All other components within normal limits   CK TOTAL AND CKMB - Normal    Narrative:     CKMB results may be falsely decreased if patient taking Biotin.   CBC AND DIFFERENTIAL    Narrative:     The following orders were created for panel order CBC & Differential.  Procedure                               Abnormality         Status                     ---------                               -----------         ------                     CBC Auto Differential[[id number]]        Abnormal            Final result                 Please view results for these tests on the individual orders.     Medications   sodium chloride 0.9 % flush 10 mL (has no administration in time range)   morphine injection 4 mg (4 mg Intravenous Given 11/17/22 2046)   ondansetron (ZOFRAN) injection 4 mg (4 mg Intravenous Given 11/17/22 " 2046)                                          MDM  Number of Diagnoses or Management Options  Acute pain of right foot  Acute right ankle pain  Fall, initial encounter  Diagnosis management comments: Patient had IV established and blood work was obtained.  Patient was given morphine and Zofran for pain control.        Amount and/or Complexity of Data Reviewed  Clinical lab tests: reviewed    Risk of Complications, Morbidity, and/or Mortality  Presenting problems: high  Diagnostic procedures: high  Management options: high    Patient Progress  Patient progress: stable      Final diagnoses:   Fall, initial encounter   Acute right ankle pain   Acute pain of right foot       ED Disposition  ED Disposition     ED Disposition   Decision to Admit    Condition   --    Comment   Level of Care: Med/Surg [1]   Admitting Physician: CARL SIMS [[id number]]   Attending Physician: CARL SIMS [[id number]]               No follow-up provider specified.       Medication List      No changes were made to your prescriptions during this visit.          Echo Carver APRN  11/19/22 2033      Electronically signed by Echo Carver APRN at 11/19/22 2033          Physician Progress Notes (all)      Jamel Amaro MD at 11/21/22 1532        Seen and examined  Has mild pain    Working with pt    Review of Systems   All other systems reviewed and are negative.    Temp:  [97.4 °F (36.3 °C)-97.6 °F (36.4 °C)] 97.4 °F (36.3 °C)  Heart Rate:  [72-93] 93  Resp:  [18-19] 18  BP: (107-136)/(69-82) 136/80  I/O last 3 completed shifts:  In: 840 [P.O.:840]  Out: 1100 [Urine:1100]  I/O this shift:  In: 360 [P.O.:360]  Out: 500 [Urine:500]    Physical Exam  Constitutional:       Appearance: Normal appearance.   Eyes:      Pupils: Pupils are equal, round, and reactive to light.   Cardiovascular:      Rate and Rhythm: Normal rate and regular rhythm.      Pulses: Normal pulses.   Pulmonary:      Effort: Pulmonary effort is normal.    Neurological:      Mental Status: She is alert.           Trimalleolar fracture of ankle, closed, right, initial encounter    Fall, initial encounter    History of CVA (cerebrovascular accident)    Right sided weakness due to previous CVA    Trimalleolar fracture of ankle  -Ankle x-ray reviewed  -RLE placed in posterior splint and stirrup plaster splint in ED  -Orthopedic surgeon consult appreciated and following  -S/p ORIF right ankle on 11/18/2022.  -Pain medication ordered as needed  -Patient will need PT/OT following surgery due to multiple recent falls with injuries   -Case management consult for possible rehab placement due to multiple recent falls with injury  - Toe-touch weight-bear for 6 weeks.  Likely need rehab placement   Constipation; stool softeners.  Patient has a previous history of a CVA approximately 18 years ago  -Residual right-sided weakness noted  -Patient reports no home medications   Acute blood loss anemia; hemoglobin 12.6 on arrival currently 10.6 continue to monitor.   Acute kidney injury; most likely prerenal serum creatinine on admission 1.4, up to 1.59 today patient received IV fluid, nephrology consulted and following.  Renal ultrasound pending   Hyperglycemia; stress/reactive check A1c.  Disposition; PT OT eval, await placement in subacute rehab.  Renal ultrasound pending.  DVT prophylaxis:  Mechanical DVT prophylaxis orders are present.    Electronically signed by Jamel Amaro MD at 11/21/22 1534     Ramin Pickard MD at 11/21/22 1306          [facility] NEPHROLOGY PROGRESS NOTE     LOS: 1 day    Patient Care Team:  Gavi Calvert APRN as PCP - General (Nurse Practitioner)      Subjective     Patient resting comfortably.  Denies any pain, chest pain, shortness of breath, nausea or vomiting    Objective     Vital Sign Min/Max for last 24 hours  Temp:  [97.4 °F (36.3 °C)-97.6 °F (36.4 °C)] 97.4 °F (36.3 °C)  Heart Rate:  [72-93] 93  Resp:  [18-19] 18  BP: (107-136)/(69-82) 136/80           "             Flowsheet Rows    Flowsheet Row First Filed Value   Admission Height 152.4 cm (60\") Documented at 11/17/2022 1924   Admission Weight 71.7 kg (158 lb) Documented at 11/17/2022 1924          No intake/output data recorded.  I/O last 3 completed shifts:  In: 840 [P.O.:840]  Out: 1100 [Urine:1100]    Physical Exam:  Physical Exam    General Appearance: alert, oriented x 3, no acute distress   Skin: warm and dry  HEENT: oral mucosa normal, nonicteric sclera  Neck: supple, no JVD  Lungs: CTA  Heart: RRR, normal S1 and S2  Abdomen: soft, nontender, nondistended  : no palpable bladder  Extremities: no edema, cyanosis or clubbing  Neuro: normal speech and mental status        LABS:  Lab Results   Component Value Date    CALCIUM 8.8 11/20/2022    PHOS 3.4 11/20/2022     Results from last 7 days   Lab Units 11/20/22  0114 11/19/22  0239 11/18/22  0510 11/17/22 2024   SODIUM mmol/L 139 138 140 140   POTASSIUM mmol/L 4.1 4.5 4.2 4.0   CHLORIDE mmol/L 103 105 103 105   CO2 mmol/L 18.0* 16.0* 20.0* 23.0   BUN mg/dL 23 19 17 14   CREATININE mg/dL 1.50* 1.59* 1.48* 1.06*   GLUCOSE mg/dL 95 122* 103* 110*   CALCIUM mg/dL 8.8 8.6 9.2 9.7   WBC 10*3/mm3 6.90 6.30 6.80 11.20*   HEMOGLOBIN g/dL 10.6* 11.0* 11.3* 12.6   PLATELETS 10*3/mm3 336 180 348 361   ALT (SGPT) U/L  --   --   --  16   AST (SGOT) U/L  --   --   --  16     Lab Results   Component Value Date    CKTOTAL 64 11/17/2022    CKMB <1.00 11/17/2022     Estimated Creatinine Clearance: 33.2 mL/min (A) (by C-G formula based on SCr of 1.5 mg/dL (H)).      Brief Urine Lab Results  (Last result in the past 365 days)      Color   Clarity   Blood   Leuk Est   Nitrite   Protein   CREAT   Urine HCG        11/21/22 0250 Yellow   Clear   Negative   Negative   Negative   Negative               WEIGHTS:     Wt Readings from Last 1 Encounters:   11/21/22 0334 78 kg (171 lb 15.3 oz)   11/18/22 0254 74.1 kg (163 lb 5.8 oz)   11/18/22 0122 74.1 kg (163 lb 5.8 oz)   11/17/22 1924 " 71.7 kg (158 lb)       aspirin, 325 mg, Oral, Daily  polyethylene glycol, 17 g, Oral, Daily  senna-docusate sodium, 2 tablet, Oral, BID  sodium chloride, 10 mL, Intravenous, Q12H           Assessment & Plan       1. Acute kidney injury.  Nonoliguric ATN due to dehydration and hypotension.  Creatinine stable around 1.5 Mg/DL.  Electrolytes, volume status okay .  Patient is nonoliguric  2.  Right trimalleolar ankle fracture.  S/p ORIF     Plan:  Resume gentle IV hydration  Repeat urinalysis      Ramin Pickard MD  11/21/22  13:06 EST    Electronically signed by Ramin Pickard MD at 11/21/22 1309     Taran Marie MD at 11/21/22 1221               LOS: 1 day   Patient Care Team:  Gavi Calvert APRN as PCP - General (Nurse Practitioner)        Subjective     Postop day 2 from ORIF right ankle, not able to herself up or walk      Objective     Vital Signs  Vitals:    11/20/22 2100 11/21/22 0008 11/21/22 0334 11/21/22 0813   BP: 107/69 135/81 127/82 136/80   BP Location: Left arm Left arm Left arm    Patient Position: Lying Lying Lying    Pulse: 72 93 91 93   Resp: 18 18 19 18   Temp: 97.6 °F (36.4 °C) 97.6 °F (36.4 °C) 97.6 °F (36.4 °C) 97.4 °F (36.3 °C)   TempSrc: Oral Oral Oral Oral   SpO2: 95% 94% 96% 93%   Weight:   78 kg (171 lb 15.3 oz)    Height:           Physical Exam:   Alert and orient x3 sitting in chair  Splint intact moving toes well     Results Review:     I reviewed the patient's new clinical results.  I reviewed the patient's new imaging results    Lab Results (last 24 hours)     Procedure Component Value Units Date/Time    Hemoglobin A1c [[id number]]  (Normal) Collected: 11/19/22 0239    Specimen: Blood Updated: 11/21/22 1054     Hemoglobin A1C 5.3 %     Narrative:      Hemoglobin A1C Reference Range:    <5.7 %        Normal  5.7-6.4 %     Increased risk for diabetes  > 6.4 %        Diabetes       These guidelines have been recommended by the American Diabetic Association for Hgb A1c.      The following 2010  guidelines have been recommended by the American Diabetes Association for Hemoglobin A1c.    HBA1c 5.7-6.4% Increased risk for future diabetes (pre-diabetes)  HBA1c     >6.4% Diabetes      Urinalysis With Culture If Indicated - Urine, Clean Catch [[id number]]  (Normal) Collected: 11/21/22 0250    Specimen: Urine, Clean Catch Updated: 11/21/22 0331     Color, UA Yellow     Appearance, UA Clear     pH, UA 5.5     Specific Gravity, UA 1.014     Glucose, UA Negative     Ketones, UA Negative     Bilirubin, UA Negative     Blood, UA Negative     Protein, UA Negative     Leuk Esterase, UA Negative     Nitrite, UA Negative     Urobilinogen, UA 0.2 E.U./dL    Narrative:      In absence of clinical symptoms, the presence of pyuria, bacteria, and/or nitrites on the urinalysis result does not correlate with infection.  Urine microscopic not indicated.    Phosphorus [[id number]]  (Normal) Collected: 11/20/22 1939    Specimen: Blood Updated: 11/20/22 2016     Phosphorus 3.4 mg/dL      Comment: Result checked             Imaging Results (Last 24 Hours)     Procedure Component Value Units Date/Time    US Renal Bilateral [[id number]] Collected: 11/21/22 0742     Updated: 11/21/22 0746    Narrative:      Examination: US RENAL BILATERAL-     Date of Exam: 11/20/2022 11:18 PM     Indication: jhonny; W19.XXXA-Unspecified fall, initial encounter;  M25.571-Pain in right ankle and joints of right foot; M79.671-Pain in  right foot.     Comparison: None available.     Technique: Grayscale and color Doppler ultrasound evaluation of the  kidneys and urinary bladder was performed     Findings:     Study somewhat limited by body habitus.        The right kidney measures 10.3 x 5.0 x 5.1 cm and the left kidney  measures 9.7 x 4.0 x 4.5 cm.     Simple appearing 1.9 x 1.8 x 2.2 cm cyst off the inferior pole right  kidney. There is no hydronephrosis. Mild bilateral cortical thinning  noted. Echogenicity is unremarkable.     Limited visualization of the  urinary bladder is unremarkable.       Impression:         1. No hydronephrosis  2. Mild bilateral cortical thinning  3. Bladder is grossly unremarkable in appearance     Electronically Signed By-Osmani Oliver On:2022 7:43 AM  This report was finalized on [id number] by  Osmani Oliver, .            Medication Review:   Scheduled Meds:aspirin, 325 mg, Oral, Daily  polyethylene glycol, 17 g, Oral, Daily  senna-docusate sodium, 2 tablet, Oral, BID  sodium chloride, 10 mL, Intravenous, Q12H      Continuous Infusions:   PRN Meds:.•  acetaminophen  •  senna-docusate sodium **AND** polyethylene glycol **AND** bisacodyl **AND** bisacodyl  •  HYDROcodone-acetaminophen  •  melatonin  •  Morphine **AND** naloxone  •  ondansetron **OR** ondansetron  •  oxyCODONE  •  oxyCODONE  •  potassium & sodium phosphates **OR** potassium & sodium phosphates  •  sodium chloride  •  [COMPLETED] Insert Peripheral IV **AND** sodium chloride  •  sodium chloride  •  sodium chloride      Assessment & Plan     Stable with right ankle fracture    Plan for disposition: Toe-touch weight-bear for 6 weeks postop.  Will need rehab placement.  Return to see 10 to 14 days postop for staple removal    Taran Marie MD  22  12:21 EST          Electronically signed by Taran Marie MD at 22 1222     Austin Romero MD at 22 1449              [facility] Progress Note      Patient Name: Kathe Burt  : 1955  MRN: [id number]  Primary Care Physician:  Gavi Calvert, RUTH  Date of admission: 2022    Subjective     Interval History:   Feels well, pain is less controlled, eating and drinking, no soa n/v or cp    Review of Systems:   14 point review of systems is otherwise negative except for mentioned above on HPI    Objective     Vitals:   Temp:  [97.4 °F (36.3 °C)-97.8 °F (36.6 °C)] 97.8 °F (36.6 °C)  Heart Rate:  [86-95] 88  Resp:  [16] 16  BP: ()/(57-76)  101/66    Intake/Output Summary (Last 24 hours) at 11/20/2022 1449  Last data filed at 11/20/2022 0957  Gross per 24 hour   Intake 240 ml   Output 900 ml   Net -660 ml       Physical Exam:    General Appearance: alert, oriented x 3, no acute distress   Skin: warm and dry  HEENT: oral mucosa normal, nonicteric sclera  Neck: supple, no JVD  Lungs: CTA  Heart: RRR, normal S1 and S2  Abdomen: soft, nontender, nondistended  : no palpable bladder  Extremities: no edema, cyanosis or clubbing  Neuro: normal speech and mental status     Scheduled Meds:     aspirin, 325 mg, Oral, Daily  polyethylene glycol, 17 g, Oral, Daily  senna-docusate sodium, 2 tablet, Oral, BID  sodium chloride, 10 mL, Intravenous, Q12H      IV Meds:        Results Reviewed:   I have personally reviewed the results from the time of this admission to 11/20/2022 14:49 EST     Results from last 7 days   Lab Units 11/20/22 0114 11/19/22 0239 11/18/22 0510 11/17/22 2024   SODIUM mmol/L 139 138 140 140   POTASSIUM mmol/L 4.1 4.5 4.2 4.0   CHLORIDE mmol/L 103 105 103 105   CO2 mmol/L 18.0* 16.0* 20.0* 23.0   BUN mg/dL 23 19 17 14   CREATININE mg/dL 1.50* 1.59* 1.48* 1.06*   CALCIUM mg/dL 8.8 8.6 9.2 9.7   BILIRUBIN mg/dL  --   --   --  0.4   ALK PHOS U/L  --   --   --  174*   ALT (SGPT) U/L  --   --   --  16   AST (SGOT) U/L  --   --   --  16   GLUCOSE mg/dL 95 122* 103* 110*     Estimated Creatinine Clearance: 32.2 mL/min (A) (by C-G formula based on SCr of 1.5 mg/dL (H)).  Results from last 7 days   Lab Units 11/20/22 0114 11/19/22 0239   PHOSPHORUS mg/dL 2.4* 3.5         Results from last 7 days   Lab Units 11/20/22 0114 11/19/22 0239 11/18/22 0510 11/17/22 2024   WBC 10*3/mm3 6.90 6.30 6.80 11.20*   HEMOGLOBIN g/dL 10.6* 11.0* 11.3* 12.6   PLATELETS 10*3/mm3 336 180 348 361           Assessment / Plan     ASSESSMENT:  1. [id number] says she was followed in the past by a nephrologist who is no longer in the area, she desires follow up with me  post dc, will arrange; likely due to past nsaid use and possibly past htn  2. Persistent proteinuria-will require renal followup  3. S/p trimalleolar anklefracture    PLAN:  1. Avoid nsaids and other  Nephrotoxins  2. heplocked ivf  3. Check renal ultrasound  4. Dc home is ok from a renal standpoint with renal follow up    Thank you for involving us in the care of Kathe Burt.  Please feel free to call with any questions.    Austin Romero MD  11/20/22  14:49 EST    [facility]  [phone]        Electronically signed by Austin Romero MD at 11/20/22 7967     Taran Marie MD at 11/20/22 3094               LOS: 1 day   Patient Care Team:  Gavi Calvert APRN as PCP - General (Nurse Practitioner)        Subjective     Postop day 2 from ORIF right ankle, now painful as her block is worn off.  Also has right proximal humerus fracture I am not treating her for      Objective     Vital Signs  Vitals:    11/19/22 0413 11/19/22 2004 11/20/22 0354 11/20/22 1121   BP: 128/79 92/60 133/76 (!) 89/57   BP Location: Left arm Left arm Left arm Left arm   Patient Position: Lying Lying Lying Lying   Pulse: 95 86 95 95   Resp: 16 16 16 16   Temp: 97.6 °F (36.4 °C) 97.4 °F (36.3 °C) 97.5 °F (36.4 °C) 97.8 °F (36.6 °C)   TempSrc: Oral Oral Oral Oral   SpO2: 95% 96% 94% 96%   Weight:       Height:           Physical Exam:   White female, no pain stress, alert and orient x3 sitting up in bed  Right ankle splinted.  Able to feel toes with 4/5 dorsiflexion plantarflexion great toe       Results Review:     I reviewed the patient's new clinical results.  I reviewed the patient's new imaging results    Lab Results (last 24 hours)     Procedure Component Value Units Date/Time    CBC & Differential [[id number]]  (Abnormal) Collected: 11/20/22 0114    Specimen: Blood Updated: 11/20/22 0229    Narrative:      The following orders were created for panel order CBC & Differential.  Procedure                                Abnormality         Status                     ---------                               -----------         ------                     CBC Auto Differential[[id number]]        Abnormal            Final result                 Please view results for these tests on the individual orders.    CBC Auto Differential [[id number]]  (Abnormal) Collected: 11/20/22 0114    Specimen: Blood Updated: 11/20/22 0229     WBC 6.90 10*3/mm3      RBC 3.67 10*6/mm3      Hemoglobin 10.6 g/dL      Hematocrit 33.0 %      MCV 89.9 fL      MCH 29.0 pg      MCHC 32.2 g/dL      RDW 15.3 %      RDW-SD 48.1 fl      MPV 9.2 fL      Platelets 336 10*3/mm3      Comment: Result checked          Neutrophil % 64.7 %      Lymphocyte % 27.3 %      Monocyte % 7.6 %      Eosinophil % 0.3 %      Basophil % 0.1 %      Neutrophils, Absolute 4.50 10*3/mm3      Lymphocytes, Absolute 1.90 10*3/mm3      Monocytes, Absolute 0.50 10*3/mm3      Eosinophils, Absolute 0.00 10*3/mm3      Basophils, Absolute 0.00 10*3/mm3      nRBC 0.0 /100 WBC     Renal Function Panel [[id number]]  (Abnormal) Collected: 11/20/22 0114    Specimen: Blood Updated: 11/20/22 0227     Glucose 95 mg/dL      BUN 23 mg/dL      Creatinine 1.50 mg/dL      Sodium 139 mmol/L      Potassium 4.1 mmol/L      Chloride 103 mmol/L      CO2 18.0 mmol/L      Calcium 8.8 mg/dL      Albumin 3.60 g/dL      Phosphorus 2.4 mg/dL      Anion Gap 18.0 mmol/L      BUN/Creatinine Ratio 15.3     eGFR 37.8 mL/min/1.73      Comment: National Kidney Foundation and American Society of Nephrology (ASN) Task Force recommended calculation based on the Chronic Kidney Disease Epidemiology Collaboration (CKD-EPI) equation refit without adjustment for race.       Narrative:      GFR Normal >60  Chronic Kidney Disease <60  Kidney Failure <15      Hemoglobin A1c [[id number]] Collected: 11/19/22 0239    Specimen: Blood Updated: 11/19/22 1303        Imaging Results (Last 24 Hours)     ** No results found for the last 24  hours. **            Medication Review:   Scheduled Meds:aspirin, 325 mg, Oral, Daily  polyethylene glycol, 17 g, Oral, Daily  senna-docusate sodium, 2 tablet, Oral, BID  sodium chloride, 10 mL, Intravenous, Q12H      Continuous Infusions:   PRN Meds:.•  acetaminophen  •  senna-docusate sodium **AND** polyethylene glycol **AND** bisacodyl **AND** bisacodyl  •  HYDROcodone-acetaminophen  •  melatonin  •  Morphine **AND** naloxone  •  ondansetron **OR** ondansetron  •  oxyCODONE  •  oxyCODONE  •  potassium & sodium phosphates **OR** potassium & sodium phosphates  •  sodium chloride  •  [COMPLETED] Insert Peripheral IV **AND** sodium chloride  •  sodium chloride  •  sodium chloride      Assessment & Plan     Stable with right proximal humerus fracture and ORIF of right trimalleolar ankle fracture    Plan for disposition: Patient will likely need rehab placement.  Continue toe-touch weight-bear for 6 weeks.  Return 10 days postop for staple removal and placement in a boot I can see her for the shoulder if she wishes me to.    Taran Marie MD  22  12:18 EST          Electronically signed by Taran aMrie MD at 22 1220     Theresa Bryant MD at 22 1037             [facility] Medicine Services        Patient Name: Kathe Burt  : 1955  MRN: [id number]  Primary Care Physician:  Provider, No Known  Date of admission: 2022        Subjective       Chief Complaint: Right ankle pain following a fall     History of Present Illness: Kathe Burt is a 67 y.o. female with a past medical history of a CVA approximately 18 years ago with residual right-sided weakness who presented to [facility] on 2022 complaining of right ankle pain following a fall.  She states that she was using the restroom and when she stood up she lost her balance and fell to the ground landing on her right side.  She was unable to get up from the fall.  Her family found her approximately  2 hours later and called EMS.  She denies hitting her head or any loss of consciousness.  She describes her pain is located in her right lateral heel, at throbbing and she rates it currently a 9 out of 10.  She denies any weakness or dizziness prior to the fall, she reports that she did not take any pain medication prior to the fall.  Chest pain, shortness of breath.  She does state that this is her second fall recently.  On 11/2/2022 she fell out of bed and sustained a right proximal humeral neck fracture.  Her arm was placed in a sling and and she released from the [facility] ED to follow-up with orthopedic MD outpatient.  She denies sustaining any new injury to her arm in the most recent fall.     In the ED, an x-ray of her right ankle shows a comminuted trimalleolar fracture dislocation of the ankle.  There is a large amount of soft tissue swelling extending from the ankle dislocation.  There is irregularity at the medial aspect of the navicular bone which may represent fracture or secondary ossification center.  Creatinine is 1.06, WBC 11.2, CK is 64, CK-MB less than 1.  All other labs are unremarkable.  She is afebrile, all vital signs are stable.  Her right extremity was placed in a posterior splint and stirrup plaster splint in the ED.  Orthopedic surgery was consulted and plan to take patient to the OR tomorrow afternoon.  Hospitalist was consulted for further care and management.   11/18/2022; patient is lying in the bed, still complaining of a lot of pain right lower extremity hemodynamically stable patient to go for ORIF this afternoon.  11/19/2022; patient is sitting on easy chair at the bedside, pain is controlled, hemodynamically stable, PT OT recommending subacute rehab await placement,   11/20/2022; afebrile vital signs are stable, complaining of increased pain in the right lower extremity but controlled with pain medication, hemodynamically stable, no bowel movement yet, continue stool  softeners, needs placement in subacute rehab.,  Acute kidney injury nephrology following, renal ultrasound pending.  Review of Systems   Constitutional: Negative.   HENT: Negative.    Eyes: Negative.    Cardiovascular: Negative.    Respiratory: Negative.    Hematologic/Lymphatic: Negative.    Skin: Negative.    Musculoskeletal: Positive for falls and joint pain (Right ankle).   Gastrointestinal: Negative.    Genitourinary: Negative.    Neurological: Negative.    Psychiatric/Behavioral: Negative.    Allergic/Immunologic: Negative.          Personal History      Medical History        Past Medical History:   Diagnosis Date   • Stroke (HCC)              Surgical History   History reviewed. No pertinent surgical history.        Family History: family history is not on file. Otherwise pertinent FHx was reviewed and not pertinent to current issue.     Social History:       Home Medications:           Prior to Admission Medications      Prescriptions Last Dose Informant Patient Reported? Taking?     HYDROcodone-acetaminophen (NORCO) 5-325 MG per tablet     No No     Take 1 tablet by mouth Every 6 (Six) Hours As Needed for Moderate Pain.                Allergies:  No Known Allergies     Objective       Vitals:   Temp:  [97.4 °F (36.3 °C)-97.5 °F (36.4 °C)] 97.5 °F (36.4 °C)  Heart Rate:  [86-95] 95  Resp:  [16] 16  BP: ()/(60-76) 133/76     Physical Exam  Vitals and nursing note reviewed.   Constitutional:       Appearance: Normal appearance.   HENT:      Head: Normocephalic and atraumatic.      Nose: Nose normal.      Mouth/Throat:      Mouth: Mucous membranes are moist.   Eyes:      Extraocular Movements: Extraocular movements intact.      Pupils: Pupils are equal, round, and reactive to light.   Cardiovascular:      Rate and Rhythm: Normal rate and regular rhythm.      Pulses: Normal pulses.      Heart sounds: Normal heart sounds.   Pulmonary:      Effort: Pulmonary effort is normal.      Breath sounds: Normal  breath sounds.   Abdominal:      General: Bowel sounds are normal.      Palpations: Abdomen is soft.   Musculoskeletal:         General: Normal range of motion.      Right upper arm: Tenderness (Right arm in a sling due to previous fracture) present.      Cervical back: Normal range of motion.      Right lower leg: Right lower leg covered in Ace wrap.     Comments: Capillary refill less than 2 on right lower extremity, sensation is intact   Skin:     General: Skin is warm and dry.   Neurological:      General: No focal deficit present.      Mental Status: She is alert and oriented to person, place, and time. Mental status is at baseline.   Psychiatric:         Mood and Affect: Mood normal.         Behavior: Behavior normal.         Result Review    Result Review:  I have personally reviewed the results from the time of this admission to 11/18/2022 00:57 EST and agree with these findings:  [x]?  Laboratory  []?  Microbiology  [x]?  Radiology  []?  EKG/Telemetry   []?  Cardiology/Vascular   []?  Pathology  []?  Old records  []?  Other:  Most notable findings include: As above  Lab Results   Component Value Date    GLUCOSE 95 11/20/2022    CALCIUM 8.8 11/20/2022     11/20/2022    K 4.1 11/20/2022    CO2 18.0 (L) 11/20/2022     11/20/2022    BUN 23 11/20/2022    CREATININE 1.50 (H) 11/20/2022    BCR 15.3 11/20/2022    ANIONGAP 18.0 (H) 11/20/2022     Lab Results   Component Value Date    WBC 6.90 11/20/2022    HGB 10.6 (L) 11/20/2022    HCT 33.0 (L) 11/20/2022    MCV 89.9 11/20/2022     11/20/2022        Assessment & Plan          Active Hospital Problems:       Active Hospital Problems     Diagnosis     • **Trimalleolar fracture of ankle, closed, right, initial encounter     • Fall, initial encounter     • History of CVA (cerebrovascular accident)     • Right sided weakness due to previous CVA        Plan:      Trimalleolar fracture of ankle  -Ankle x-ray reviewed  -RLE placed in posterior splint and  stirrup plaster splint in ED  -Orthopedic surgeon consult appreciated and following  -S/p ORIF right ankle on 2022.  -Pain medication ordered as needed  -Patient will need PT/OT following surgery due to multiple recent falls with injuries   -Case management consult for possible rehab placement due to multiple recent falls with injury  - Toe-touch weight-bear for 6 weeks.  Likely need rehab placement   Constipation; stool softeners.  Patient has a previous history of a CVA approximately 18 years ago  -Residual right-sided weakness noted  -Patient reports no home medications   Acute blood loss anemia; hemoglobin 12.6 on arrival currently 10.6 continue to monitor.   Acute kidney injury; most likely prerenal serum creatinine on admission 1.4, up to 1.59 today patient received IV fluid, nephrology consulted and following.  Renal ultrasound pending   Hyperglycemia; stress/reactive check A1c.  Disposition; PT OT eval, await placement in subacute rehab.  Renal ultrasound pending.  DVT prophylaxis:  Mechanical DVT prophylaxis orders are present.     CODE STATUS:    Code Status (Patient has no pulse and is not breathing): CPR (Attempt to Resuscitate)  Medical Interventions (Patient has pulse or is breathing): Full Support     Admission Status:  I believe this patient meets inpatient status.     I discussed the patient's findings and my recommendations with patient.     This patient has been examined wearing appropriate Personal Protective Equipment.      Electronically signed by Theresa Bryant MD, 22, 10:38 AM EST.                Electronically signed by Theresa Bryant MD at 22 1039     Austin Romero MD at 22 1238              [facility] Progress Note      Patient Name: Kathe Burt  : 1955  MRN: [id number]  Primary Care Physician:  Gavi Calvert, APRN  Date of admission: 2022    Subjective     Interval History:   Feels well, pain controlled, eating  and drinking, no soa n/v or cp    Review of Systems:   14 point review of systems is otherwise negative except for mentioned above on HPI    Objective     Vitals:   Temp:  [97.4 °F (36.3 °C)-98.7 °F (37.1 °C)] 97.6 °F (36.4 °C)  Heart Rate:  [] 95  Resp:  [10-19] 16  BP: ()/(47-79) 128/79  Flow (L/min):  [2-6] 2    Intake/Output Summary (Last 24 hours) at 11/19/2022 1238  Last data filed at 11/19/2022 0904  Gross per 24 hour   Intake 3710 ml   Output 100 ml   Net 3610 ml       Physical Exam:    General Appearance: alert, oriented x 3, no acute distress   Skin: warm and dry  HEENT: oral mucosa normal, nonicteric sclera  Neck: supple, no JVD  Lungs: CTA  Heart: RRR, normal S1 and S2  Abdomen: soft, nontender, nondistended  : no palpable bladder  Extremities: no edema, cyanosis or clubbing  Neuro: normal speech and mental status     Scheduled Meds:     aspirin, 325 mg, Oral, Daily  polyethylene glycol, 17 g, Oral, Daily  senna-docusate sodium, 2 tablet, Oral, BID  sodium chloride, 10 mL, Intravenous, Q12H      IV Meds:        Results Reviewed:   I have personally reviewed the results from the time of this admission to 11/19/2022 12:38 EST     Results from last 7 days   Lab Units 11/19/22  0239 11/18/22  0510 11/17/22 2024   SODIUM mmol/L 138 140 140   POTASSIUM mmol/L 4.5 4.2 4.0   CHLORIDE mmol/L 105 103 105   CO2 mmol/L 16.0* 20.0* 23.0   BUN mg/dL 19 17 14   CREATININE mg/dL 1.59* 1.48* 1.06*   CALCIUM mg/dL 8.6 9.2 9.7   BILIRUBIN mg/dL  --   --  0.4   ALK PHOS U/L  --   --  174*   ALT (SGPT) U/L  --   --  16   AST (SGOT) U/L  --   --  16   GLUCOSE mg/dL 122* 103* 110*     Estimated Creatinine Clearance: 30.4 mL/min (A) (by C-G formula based on SCr of 1.59 mg/dL (H)).  Results from last 7 days   Lab Units 11/19/22  0239   PHOSPHORUS mg/dL 3.5         Results from last 7 days   Lab Units 11/19/22  0239 11/18/22  0510 11/17/22 2024   WBC 10*3/mm3 6.30 6.80 11.20*   HEMOGLOBIN g/dL 11.0* 11.3* 12.6    PLATELETS 10*3/mm3 180 348 361           Assessment / Plan     ASSESSMENT:  1. [id number] says she was followed in the past by a nephrologist who is no longer in the area, she desires follow up with me post dc, will arrange; likely due to past nsaid use and possibly past htn  2. Persistent proteinuria-will require renal followup  3. S/p trimalleolar anklefracture    PLAN:  1. Avoid nsaids and other  Nephrotoxins  2. heplock ivf  3. Check renal ultrasound  4. Dc home is ok from a renal standpoint with renal follow up    Thank you for involving us in the care of Kathe Burt.  Please feel free to call with any questions.    Austin Romero MD  22  12:38 EST    [facility]  [phone]        Electronically signed by Austin Romero MD at 22 1241     Theresa Bryant MD at 22 7240             [facility] Medicine Services        Patient Name: Kathe Burt  : 1955  MRN: [id number]  Primary Care Physician:  Provider, No Known  Date of admission: 2022        Subjective       Chief Complaint: Right ankle pain following a fall     History of Present Illness: Kathe Burt is a 67 y.o. female with a past medical history of a CVA approximately 18 years ago with residual right-sided weakness who presented to [facility] on 2022 complaining of right ankle pain following a fall.  She states that she was using the restroom and when she stood up she lost her balance and fell to the ground landing on her right side.  She was unable to get up from the fall.  Her family found her approximately 2 hours later and called EMS.  She denies hitting her head or any loss of consciousness.  She describes her pain is located in her right lateral heel, at throbbing and she rates it currently a 9 out of 10.  She denies any weakness or dizziness prior to the fall, she reports that she did not take any pain medication prior to the fall.  Chest  pain, shortness of breath.  She does state that this is her second fall recently.  On 11/2/2022 she fell out of bed and sustained a right proximal humeral neck fracture.  Her arm was placed in a sling and and she released from the [facility] ED to follow-up with orthopedic MD outpatient.  She denies sustaining any new injury to her arm in the most recent fall.     In the ED, an x-ray of her right ankle shows a comminuted trimalleolar fracture dislocation of the ankle.  There is a large amount of soft tissue swelling extending from the ankle dislocation.  There is irregularity at the medial aspect of the navicular bone which may represent fracture or secondary ossification center.  Creatinine is 1.06, WBC 11.2, CK is 64, CK-MB less than 1.  All other labs are unremarkable.  She is afebrile, all vital signs are stable.  Her right extremity was placed in a posterior splint and stirrup plaster splint in the ED.  Orthopedic surgery was consulted and plan to take patient to the OR tomorrow afternoon.  Hospitalist was consulted for further care and management.   11/18/2022; patient is lying in the bed, still complaining of a lot of pain right lower extremity hemodynamically stable patient to go for ORIF this afternoon.  11/19/2022; patient is sitting on easy chair at the bedside, pain is controlled, hemodynamically stable, PT OT recommending subacute rehab await placement,     Review of Systems   Constitutional: Negative.   HENT: Negative.    Eyes: Negative.    Cardiovascular: Negative.    Respiratory: Negative.    Hematologic/Lymphatic: Negative.    Skin: Negative.    Musculoskeletal: Positive for falls and joint pain (Right ankle).   Gastrointestinal: Negative.    Genitourinary: Negative.    Neurological: Negative.    Psychiatric/Behavioral: Negative.    Allergic/Immunologic: Negative.          Personal History      Medical History        Past Medical History:   Diagnosis Date   • Stroke (HCC)               Surgical History   History reviewed. No pertinent surgical history.        Family History: family history is not on file. Otherwise pertinent FHx was reviewed and not pertinent to current issue.     Social History:       Home Medications:           Prior to Admission Medications      Prescriptions Last Dose Informant Patient Reported? Taking?     HYDROcodone-acetaminophen (NORCO) 5-325 MG per tablet     No No     Take 1 tablet by mouth Every 6 (Six) Hours As Needed for Moderate Pain.                Allergies:  No Known Allergies     Objective       Vitals:   Temp:  [97.4 °F (36.3 °C)-98.7 °F (37.1 °C)] 97.6 °F (36.4 °C)  Heart Rate:  [] 95  Resp:  [10-19] 16  BP: ()/(47-79) 128/79  Flow (L/min):  [2-6] 2     Physical Exam  Vitals and nursing note reviewed.   Constitutional:       Appearance: Normal appearance.   HENT:      Head: Normocephalic and atraumatic.      Nose: Nose normal.      Mouth/Throat:      Mouth: Mucous membranes are moist.   Eyes:      Extraocular Movements: Extraocular movements intact.      Pupils: Pupils are equal, round, and reactive to light.   Cardiovascular:      Rate and Rhythm: Normal rate and regular rhythm.      Pulses: Normal pulses.      Heart sounds: Normal heart sounds.   Pulmonary:      Effort: Pulmonary effort is normal.      Breath sounds: Normal breath sounds.   Abdominal:      General: Bowel sounds are normal.      Palpations: Abdomen is soft.   Musculoskeletal:         General: Normal range of motion.      Right upper arm: Tenderness (Right arm in a sling due to previous fracture) present.      Cervical back: Normal range of motion.      Right lower leg: Right lower leg covered in Ace wrap.     Comments: Capillary refill less than 2 on right lower extremity, sensation is intact   Skin:     General: Skin is warm and dry.   Neurological:      General: No focal deficit present.      Mental Status: She is alert and oriented to person, place, and time. Mental status is  at baseline.   Psychiatric:         Mood and Affect: Mood normal.         Behavior: Behavior normal.         Result Review    Result Review:  I have personally reviewed the results from the time of this admission to 11/18/2022 00:57 EST and agree with these findings:  [x]?  Laboratory  []?  Microbiology  [x]?  Radiology  []?  EKG/Telemetry   []?  Cardiology/Vascular   []?  Pathology  []?  Old records  []?  Other:  Most notable findings include: As above  Lab Results   Component Value Date    GLUCOSE 122 (H) 11/19/2022    CALCIUM 8.6 11/19/2022     11/19/2022    K 4.5 11/19/2022    CO2 16.0 (L) 11/19/2022     11/19/2022    BUN 19 11/19/2022    CREATININE 1.59 (H) 11/19/2022    BCR 11.9 11/19/2022    ANIONGAP 17.0 (H) 11/19/2022     Lab Results   Component Value Date    WBC 6.30 11/19/2022    HGB 11.0 (L) 11/19/2022    HCT 35.6 11/19/2022    MCV 92.2 11/19/2022     11/19/2022        Assessment & Plan          Active Hospital Problems:       Active Hospital Problems     Diagnosis     • **Trimalleolar fracture of ankle, closed, right, initial encounter     • Fall, initial encounter     • History of CVA (cerebrovascular accident)     • Right sided weakness due to previous CVA        Plan:      Trimalleolar fracture of ankle  -Ankle x-ray reviewed  -RLE placed in posterior splint and stirrup plaster splint in ED  -Orthopedic surgeon consult appreciated and following  -S/p ORIF right ankle on 11/18/2022.  -Pain medication ordered as needed  -Patient will need PT/OT following surgery due to multiple recent falls with injuries   -Case management consult for possible rehab placement due to multiple recent falls with injury  - Toe-touch weight-bear for 6 weeks.  Likely need rehab placement     Patient has a previous history of a CVA approximately 18 years ago  -Residual right-sided weakness noted  -Patient reports no home medications   Acute kidney injury; most likely prerenal serum creatinine on admission  1.4, up to 1.59 today patient received IV fluid, nephrology consulted and following.     Hyperglycemia; stress/reactive check A1c.  Disposition; PT OT eval, await placement in subacute rehab.  DVT prophylaxis:  Mechanical DVT prophylaxis orders are present.     CODE STATUS:    Code Status (Patient has no pulse and is not breathing): CPR (Attempt to Resuscitate)  Medical Interventions (Patient has pulse or is breathing): Full Support     Admission Status:  I believe this patient meets inpatient status.     I discussed the patient's findings and my recommendations with patient.     This patient has been examined wearing appropriate Personal Protective Equipment.    Electronically signed by Theresa Bryant MD, 11/19/22, 12:05 PM EST.              Electronically signed by Theresa Bryant MD at 11/19/22 1205     Taran Marie MD at 11/19/22 0852               LOS: 1 day   Patient Care Team:  Gavi Calvert APRN as PCP - General (Nurse Practitioner)        Subjective     Postop day 1 from ORIF right trimalleolar ankle fracture, much more comfortable      Objective     Vital Signs  Vitals:    11/18/22 2020 11/18/22 2032 11/19/22 0007 11/19/22 0413   BP: 128/75 121/79 121/75 128/79   BP Location:  Left arm Left arm Left arm   Patient Position:  Lying Lying Lying   Pulse: 89 89 93 95   Resp: 14 15 14 16   Temp: 97.4 °F (36.3 °C) 97.6 °F (36.4 °C) 97.6 °F (36.4 °C) 97.6 °F (36.4 °C)   TempSrc:  Oral Oral Oral   SpO2: 95% 97% 95% 95%   Weight:       Height:           Physical Exam:   Alert and orient x3  Poor toe motion but says has good sensation     Results Review:     I reviewed the patient's new clinical results.  I reviewed the patient's new imaging results    Lab Results (last 24 hours)     Procedure Component Value Units Date/Time    Renal Function Panel [[id number]]  (Abnormal) Collected: 11/19/22 0239    Specimen: Blood Updated: 11/19/22 0415     Glucose 122 mg/dL      BUN 19 mg/dL      Creatinine 1.59 mg/dL       Sodium 138 mmol/L      Potassium 4.5 mmol/L      Comment: Slight hemolysis detected by analyzer. Results may be affected.        Chloride 105 mmol/L      CO2 16.0 mmol/L      Calcium 8.6 mg/dL      Albumin 3.50 g/dL      Phosphorus 3.5 mg/dL      Anion Gap 17.0 mmol/L      BUN/Creatinine Ratio 11.9     eGFR 35.2 mL/min/1.73      Comment: National Kidney Foundation and American Society of Nephrology (ASN) Task Force recommended calculation based on the Chronic Kidney Disease Epidemiology Collaboration (CKD-EPI) equation refit without adjustment for race.       Narrative:      GFR Normal >60  Chronic Kidney Disease <60  Kidney Failure <15      CBC & Differential [[id number]]  (Abnormal) Collected: 11/19/22 0239    Specimen: Blood Updated: 11/19/22 0342    Narrative:      The following orders were created for panel order CBC & Differential.  Procedure                               Abnormality         Status                     ---------                               -----------         ------                     CBC Auto Differential[[id number]]        Abnormal            Final result                 Please view results for these tests on the individual orders.    CBC Auto Differential [[id number]]  (Abnormal) Collected: 11/19/22 0239    Specimen: Blood Updated: 11/19/22 0342     WBC 6.30 10*3/mm3      RBC 3.86 10*6/mm3      Hemoglobin 11.0 g/dL      Hematocrit 35.6 %      MCV 92.2 fL      MCH 28.5 pg      MCHC 30.9 g/dL      RDW 15.7 %      RDW-SD 52.1 fl      MPV 9.4 fL      Platelets 180 10*3/mm3      Neutrophil % 90.7 %      Lymphocyte % 6.7 %      Monocyte % 2.4 %      Eosinophil % 0.0 %      Basophil % 0.2 %      Neutrophils, Absolute 5.70 10*3/mm3      Lymphocytes, Absolute 0.40 10*3/mm3      Monocytes, Absolute 0.20 10*3/mm3      Eosinophils, Absolute 0.00 10*3/mm3      Basophils, Absolute 0.00 10*3/mm3      nRBC 0.0 /100 WBC     Urinalysis, Microscopic Only - Urine, Clean Catch [[id number]]  (Abnormal) Collected:  11/19/22 0100    Specimen: Urine, Clean Catch Updated: 11/19/22 0202     RBC, UA 3-5 /HPF      WBC, UA Too Numerous to Count /HPF      Bacteria, UA 4+ /HPF      Squamous Epithelial Cells, UA 13-20 /HPF      Hyaline Casts, UA None Seen /LPF      Starch, UA Small/1+ /HPF      Methodology Manual Light Microscopy    Sodium, Urine, Random - Urine, Clean Catch [[id number]] Collected: 11/19/22 0100    Specimen: Urine, Clean Catch Updated: 11/19/22 0155     Sodium, Urine 52 mmol/L     Narrative:      Reference intervals for random urine have not been established.  Clinical usage is dependent upon physician's interpretation in combination with other laboratory tests.       Urinalysis With Microscopic If Indicated (No Culture) - Urine, Clean Catch [[id number]]  (Abnormal) Collected: 11/19/22 0100    Specimen: Urine, Clean Catch Updated: 11/19/22 0148     Color, UA Yellow     Appearance, UA Cloudy     pH, UA <=5.0     Specific Gravity, UA 1.020     Glucose, UA Negative     Ketones, UA Trace     Bilirubin, UA Negative     Blood, UA Negative     Protein, UA 30 mg/dL (1+)     Leuk Esterase, UA Large (3+)     Nitrite, UA Negative     Urobilinogen, UA 0.2 E.U./dL        Imaging Results (Last 24 Hours)     Procedure Component Value Units Date/Time    FL C Arm During Surgery [[id number]] Resulted: 11/18/22 2134     Updated: 11/18/22 2134    Narrative:      This procedure was auto-finalized with no dictation required.    XR Ankle 2 View Right [[id number]] Resulted: 11/18/22 2131     Updated: 11/18/22 2132            Medication Review:   Scheduled Meds:aspirin, 325 mg, Oral, Daily  ceFAZolin, 2 g, Intravenous, Q8H  polyethylene glycol, 17 g, Oral, Daily  senna-docusate sodium, 2 tablet, Oral, BID  sodium chloride, 10 mL, Intravenous, Q12H      Continuous Infusions:sodium chloride, 75 mL/hr, Last Rate: 75 mL/hr (11/19/22 0517)      PRN Meds:.•  acetaminophen  •  senna-docusate sodium **AND** polyethylene glycol **AND** bisacodyl **AND**  bisacodyl  •  HYDROcodone-acetaminophen  •  melatonin  •  Morphine **AND** naloxone  •  ondansetron **OR** ondansetron  •  oxyCODONE  •  oxyCODONE  •  sodium chloride  •  [COMPLETED] Insert Peripheral IV **AND** sodium chloride  •  sodium chloride  •  sodium chloride      Assessment & Plan     Improved    Plan for disposition: Toe-touch weight-bear for 6 weeks.  Likely need rehab placement    Taran Marie MD  22  08:52 EST          Electronically signed by Taran Marie MD at 22 0853     Theresa Bryant MD at 22 1632             [facility] Medicine Services        Patient Name: Kathe Burt  : 1955  MRN: [id number]  Primary Care Physician:  Provider, No Known  Date of admission: 2022        Subjective       Chief Complaint: Right ankle pain following a fall     History of Present Illness: Kathe Burt is a 67 y.o. female with a past medical history of a CVA approximately 18 years ago with residual right-sided weakness who presented to [facility] on 2022 complaining of right ankle pain following a fall.  She states that she was using the restroom and when she stood up she lost her balance and fell to the ground landing on her right side.  She was unable to get up from the fall.  Her family found her approximately 2 hours later and called EMS.  She denies hitting her head or any loss of consciousness.  She describes her pain is located in her right lateral heel, at throbbing and she rates it currently a 9 out of 10.  She denies any weakness or dizziness prior to the fall, she reports that she did not take any pain medication prior to the fall.  Chest pain, shortness of breath.  She does state that this is her second fall recently.  On 2022 she fell out of bed and sustained a right proximal humeral neck fracture.  Her arm was placed in a sling and and she released from the [facility] ED to follow-up with orthopedic MD outpatient.   She denies sustaining any new injury to her arm in the most recent fall.     In the ED, an x-ray of her right ankle shows a comminuted trimalleolar fracture dislocation of the ankle.  There is a large amount of soft tissue swelling extending from the ankle dislocation.  There is irregularity at the medial aspect of the navicular bone which may represent fracture or secondary ossification center.  Creatinine is 1.06, WBC 11.2, CK is 64, CK-MB less than 1.  All other labs are unremarkable.  She is afebrile, all vital signs are stable.  Her right extremity was placed in a posterior splint and stirrup plaster splint in the ED.  Orthopedic surgery was consulted and plan to take patient to the OR tomorrow afternoon.  Hospitalist was consulted for further care and management.   11/18/2022; patient is lying in the bed, still complaining of a lot of pain right lower extremity hemodynamically stable patient to go for ORIF this afternoon.     Review of Systems   Constitutional: Negative.   HENT: Negative.    Eyes: Negative.    Cardiovascular: Negative.    Respiratory: Negative.    Hematologic/Lymphatic: Negative.    Skin: Negative.    Musculoskeletal: Positive for falls and joint pain (Right ankle).   Gastrointestinal: Negative.    Genitourinary: Negative.    Neurological: Negative.    Psychiatric/Behavioral: Negative.    Allergic/Immunologic: Negative.          Personal History      Medical History        Past Medical History:   Diagnosis Date   • Stroke (HCC)              Surgical History   History reviewed. No pertinent surgical history.        Family History: family history is not on file. Otherwise pertinent FHx was reviewed and not pertinent to current issue.     Social History:       Home Medications:           Prior to Admission Medications      Prescriptions Last Dose Informant Patient Reported? Taking?     HYDROcodone-acetaminophen (NORCO) 5-325 MG per tablet     No No     Take 1 tablet by mouth Every 6 (Six) Hours  As Needed for Moderate Pain.                Allergies:  No Known Allergies     Objective       Vitals:   Temp:  [97.6 °F (36.4 °C)-98.6 °F (37 °C)] 97.7 °F (36.5 °C)  Heart Rate:  [79-93] 93  Resp:  [15-20] 15  BP: ()/(40-89) 94/51     Physical Exam  Vitals and nursing note reviewed.   Constitutional:       Appearance: Normal appearance.   HENT:      Head: Normocephalic and atraumatic.      Nose: Nose normal.      Mouth/Throat:      Mouth: Mucous membranes are moist.   Eyes:      Extraocular Movements: Extraocular movements intact.      Pupils: Pupils are equal, round, and reactive to light.   Cardiovascular:      Rate and Rhythm: Normal rate and regular rhythm.      Pulses: Normal pulses.      Heart sounds: Normal heart sounds.   Pulmonary:      Effort: Pulmonary effort is normal.      Breath sounds: Normal breath sounds.   Abdominal:      General: Bowel sounds are normal.      Palpations: Abdomen is soft.   Musculoskeletal:         General: Normal range of motion.      Right upper arm: Tenderness (Right arm in a sling due to previous fracture) present.      Cervical back: Normal range of motion.      Right lower leg: Tenderness (Right lower extremity splinted due to fracture and in stirrup.) present.      Comments: Capillary refill less than 2 on right lower extremity, sensation is intact   Skin:     General: Skin is warm and dry.   Neurological:      General: No focal deficit present.      Mental Status: She is alert and oriented to person, place, and time. Mental status is at baseline.   Psychiatric:         Mood and Affect: Mood normal.         Behavior: Behavior normal.         Result Review    Result Review:  I have personally reviewed the results from the time of this admission to 11/18/2022 00:57 EST and agree with these findings:  [x]?  Laboratory  []?  Microbiology  [x]?  Radiology  []?  EKG/Telemetry   []?  Cardiology/Vascular   []?  Pathology  []?  Old records  []?  Other:  Most notable findings  include: As above     Assessment & Plan          Active Hospital Problems:       Active Hospital Problems     Diagnosis     • **Trimalleolar fracture of ankle, closed, right, initial encounter     • Fall, initial encounter     • History of CVA (cerebrovascular accident)     • Right sided weakness due to previous CVA        Plan:      Trimalleolar fracture of ankle  -Ankle x-ray reviewed  -RLE placed in posterior splint and stirrup plaster splint in ED  -Orthopedic surgeon consult appreciated for ORIF this afternoon  -Pain medication ordered as needed  -Patient will need PT/OT following surgery due to multiple recent falls with injuries   -Case management consult for possible rehab placement due to multiple recent falls with injury     Patient has a previous history of a CVA approximately 18 years ago  -Residual right-sided weakness noted  -Patient reports no home medications        DVT prophylaxis:  Mechanical DVT prophylaxis orders are present.     CODE STATUS:    Code Status (Patient has no pulse and is not breathing): CPR (Attempt to Resuscitate)  Medical Interventions (Patient has pulse or is breathing): Full Support     Admission Status:  I believe this patient meets inpatient status.     I discussed the patient's findings and my recommendations with patient.     This patient has been examined wearing appropriate Personal Protective Equipment.    Electronically signed by Theresa Bryant MD, 11/18/22, 4:34 PM EST.        Electronically signed by Theresa Bryant MD at 11/18/22 1634          Consult Notes (all)      Ramin Pickard MD at 11/18/22 1127      Consult Orders    1. Inpatient Nephrology Consult [[id number]] ordered by Theresa Bryant MD at 11/18/22 0819               [facility] CONSULT NOTE    Referring Provider: Dr. TOMMY Bryant  Reason for Consultation: Acute kidney injury    Chief complaint .  Status post fall, ankle pain    History of present illness:    Patient is 67 years old  female  with a history of stroke, right-sided weakness, admitted after a fall and she sustained a right ankle fracture.  As per patient she lost her balance while putting her pants on and twisted her ankle.  Patient also has history of right proximal humerus fracture in the past.  She normally uses a wheelchair.  Denied any fever, dizziness, cough, expectoration, nausea, vomiting.  Her blood pressure has been running low since last night And her creatinine increased to 1.48 mg/DL this morning    History  Past Medical History:   Diagnosis Date   • Stroke (HCC)      History reviewed. No pertinent surgical history.  Social History     Tobacco Use   • Smoking status: Never     Passive exposure: Never   • Smokeless tobacco: Never   Vaping Use   • Vaping Use: Never used     History reviewed. No pertinent family history.    Review of Systems  ROS  As per HPI  Objective     Vital Signs  Temp:  [97.6 °F (36.4 °C)-98.6 °F (37 °C)] 98 °F (36.7 °C)  Heart Rate:  [79-90] 85  Resp:  [16-20] 16  BP: ()/(40-89) 92/61    No intake/output data recorded.  No intake/output data recorded.    Physical Exam:  Physical Exam    General Appearance: alert, oriented x 3, no acute distress   Skin: warm and dry  HEENT: oral mucosa Dry, nonicteric sclera  Neck: supple, no JVD  Lungs: CTA  Heart: RRR, normal S1 and S2  Abdomen: soft, nontender, nondistended  : no palpable bladder  Extremities: Right arm in sling.no edema, cyanosis or clubbing       Results Review:   I reviewed the patient's new clinical results.    Lab Results   Component Value Date    CALCIUM 9.2 11/18/2022     Results from last 7 days   Lab Units 11/18/22  0510 11/17/22 2024   SODIUM mmol/L 140 140   POTASSIUM mmol/L 4.2 4.0   CHLORIDE mmol/L 103 105   CO2 mmol/L 20.0* 23.0   BUN mg/dL 17 14   CREATININE mg/dL 1.48* 1.06*   GLUCOSE mg/dL 103* 110*   CALCIUM mg/dL 9.2 9.7   WBC 10*3/mm3 6.80 11.20*   HEMOGLOBIN g/dL 11.3* 12.6   PLATELETS 10*3/mm3 348 361   ALT (SGPT) U/L  --   16   AST (SGOT) U/L  --  16     Lab Results   Component Value Date    CKTOTAL 64 11/17/2022    CKMB <1.00 11/17/2022     Estimated Creatinine Clearance: 32.7 mL/min (A) (by C-G formula based on SCr of 1.48 mg/dL (H)).No results found for: URICACID    Brief Urine Lab Results     None          Prior to Admission medications    Medication Sig Start Date End Date Taking? Authorizing Provider   HYDROcodone-acetaminophen (NORCO) 5-325 MG per tablet Take 1 tablet by mouth Every 6 (Six) Hours As Needed for Moderate Pain. 11/2/22   Jeremiah Sanders MD       ceFAZolin, 2 g, Intravenous, Once  senna-docusate sodium, 2 tablet, Oral, BID  sodium chloride, 10 mL, Intravenous, Q12H      sodium chloride, 75 mL/hr, Last Rate: 75 mL/hr (11/18/22 0836)        Assessment & Plan       1. Acute kidney injury.  Most likely prerenal due to dehydration and hypotension.  Creatinine 1.4 at MD/DL this morning.  Electrolyte okay.  Looks dry clinically.  Her blood pressure has been running low since last night.  Patient is nonoliguric.  She is receiving gentle IV hydration  2.  Right trimalleolar ankle fracture    Plan:  Ordered fluid bolus and continue Maintenance IV fluids  Order urinalysis and urine sodium  Avoid nephrotoxins  Hopefully creatinine start improving soon  Monitor CK level      I discussed the patients findings and my recommendations with patient and nursing staff    Ramin Pickard MD  11/18/22  11:27 EST            Electronically signed by Ramin Pickard MD at 11/18/22 1131     Taran Marie MD at 11/18/22 0874      Consult Orders    1. Ortho (on-call MD unless specified) [[id number]] ordered by Echo Carver APRN at 11/17/22 2008                   Referring Provider: Emergency room  Reason for Consultation: Right trimalleolar ankle fracture    Patient Care Team:  Provider, No Known as PCP - General      Subjective .     History of present illness:  Kathe Burt is a 67 y.o. female who presents with fall sustaining a right  trimalleolar ankle fracture.  Patient has had a right-sided stroke which leaves her very weak on the right upper and lower extremities.  Had a fall last week sustaining a right proximal humerus fracture.  She has not seen orthopedic surgeon for this yet but has follow-up scheduled.  She is currently in a sling.  She gets around in a wheelchair.  Pain is moderate.    Review of Systems:    Chest pain shortness of breath fevers or chills      History  Past Medical History:   Diagnosis Date   • Stroke (HCC)      History reviewed. No pertinent surgical history.  History reviewed. No pertinent family history.   Social History     Tobacco Use   • Smoking status: Never     Passive exposure: Never   • Smokeless tobacco: Never   Vaping Use   • Vaping Use: Never used     Medications Prior to Admission   Medication Sig Dispense Refill Last Dose   • HYDROcodone-acetaminophen (NORCO) 5-325 MG per tablet Take 1 tablet by mouth Every 6 (Six) Hours As Needed for Moderate Pain. 15 tablet 0       Patient has no known allergies.    Scheduled Meds:ceFAZolin, 2 g, Intravenous, Once  senna-docusate sodium, 2 tablet, Oral, BID  sodium chloride, 10 mL, Intravenous, Q12H      Continuous Infusions:sodium chloride, 75 mL/hr      PRN Meds:.•  acetaminophen **OR** acetaminophen **OR** acetaminophen  •  senna-docusate sodium **AND** polyethylene glycol **AND** bisacodyl **AND** bisacodyl  •  HYDROmorphone  •  melatonin  •  ondansetron **OR** ondansetron  •  oxyCODONE  •  [COMPLETED] Insert Peripheral IV **AND** sodium chloride  •  sodium chloride    Objective     Vital Signs   Vitals:    11/17/22 2316 11/18/22 0122 11/18/22 0254 11/18/22 0306   BP: 147/89 102/70  92/40   BP Location:  Left arm     Patient Position:  Lying     Pulse: 82 90 80    Resp:  20 20    Temp:  97.7 °F (36.5 °C) 97.6 °F (36.4 °C)    TempSrc:  Oral Oral    SpO2: 97% 95% 92%    Weight:  74.1 kg (163 lb 5.8 oz) 74.1 kg (163 lb 5.8 oz)    Height:             Physical  Exam:   Pleasant female, in apparent stress, alert and orient x3, mildly obese  Right ankle is in a posterior splint in plantarflexion.  Limbs have fair sensation.  Able to flex and extend the great toe.  Good capillary refill  X-rays show a comminuted Young B lateral malleolar fracture with a comminuted medial malleolar fracture and a mildly displaced posterior fracture involving about 10% the joint surface    Results Review:   I reviewed the patient's new clinical results.  I reviewed the patient's new imaging results    Lab Results (last 24 hours)     Procedure Component Value Units Date/Time    Basic Metabolic Panel [[id number]]  (Abnormal) Collected: 11/18/22 0510    Specimen: Blood Updated: 11/18/22 0647     Glucose 103 mg/dL      BUN 17 mg/dL      Creatinine 1.48 mg/dL      Sodium 140 mmol/L      Potassium 4.2 mmol/L      Chloride 103 mmol/L      CO2 20.0 mmol/L      Calcium 9.2 mg/dL      BUN/Creatinine Ratio 11.5     Anion Gap 17.0 mmol/L      eGFR 38.4 mL/min/1.73      Comment: National Kidney Foundation and American Society of Nephrology (ASN) Task Force recommended calculation based on the Chronic Kidney Disease Epidemiology Collaboration (CKD-EPI) equation refit without adjustment for race.       Narrative:      GFR Normal >60  Chronic Kidney Disease <60  Kidney Failure <15      CBC & Differential [[id number]]  (Abnormal) Collected: 11/18/22 0510    Specimen: Blood Updated: 11/18/22 0606    Narrative:      The following orders were created for panel order CBC & Differential.  Procedure                               Abnormality         Status                     ---------                               -----------         ------                     CBC Auto Differential[[id number]]        Abnormal            Final result                 Please view results for these tests on the individual orders.    CBC Auto Differential [[id number]]  (Abnormal) Collected: 11/18/22 0510    Specimen: Blood Updated: 11/18/22  0606     WBC 6.80 10*3/mm3      RBC 3.97 10*6/mm3      Hemoglobin 11.3 g/dL      Hematocrit 35.6 %      MCV 89.8 fL      MCH 28.4 pg      MCHC 31.6 g/dL      RDW 14.9 %      RDW-SD 47.3 fl      MPV 9.0 fL      Platelets 348 10*3/mm3      Neutrophil % 68.1 %      Lymphocyte % 20.5 %      Monocyte % 10.3 %      Eosinophil % 0.9 %      Basophil % 0.2 %      Neutrophils, Absolute 4.60 10*3/mm3      Lymphocytes, Absolute 1.40 10*3/mm3      Monocytes, Absolute 0.70 10*3/mm3      Eosinophils, Absolute 0.10 10*3/mm3      Basophils, Absolute 0.00 10*3/mm3      nRBC 0.1 /100 WBC     CK Total & CKMB [[id number]]  (Normal) Collected: 11/17/22 2024    Specimen: Blood Updated: 11/17/22 2055     CKMB <1.00 ng/mL      Creatine Kinase 64 U/L     Narrative:      CKMB results may be falsely decreased if patient taking Biotin.    Comprehensive Metabolic Panel [[id number]]  (Abnormal) Collected: 11/17/22 2024    Specimen: Blood Updated: 11/17/22 2054     Glucose 110 mg/dL      BUN 14 mg/dL      Creatinine 1.06 mg/dL      Sodium 140 mmol/L      Potassium 4.0 mmol/L      Chloride 105 mmol/L      CO2 23.0 mmol/L      Calcium 9.7 mg/dL      Total Protein 6.8 g/dL      Albumin 4.10 g/dL      ALT (SGPT) 16 U/L      AST (SGOT) 16 U/L      Alkaline Phosphatase 174 U/L      Total Bilirubin 0.4 mg/dL      Globulin 2.7 gm/dL      A/G Ratio 1.5 g/dL      BUN/Creatinine Ratio 13.2     Anion Gap 12.0 mmol/L      eGFR 57.3 mL/min/1.73      Comment: National Kidney Foundation and American Society of Nephrology (ASN) Task Force recommended calculation based on the Chronic Kidney Disease Epidemiology Collaboration (CKD-EPI) equation refit without adjustment for race.       Narrative:      GFR Normal >60  Chronic Kidney Disease <60  Kidney Failure <15      CBC & Differential [[id number]]  (Abnormal) Collected: 11/17/22 2024    Specimen: Blood Updated: 11/17/22 2035    Narrative:      The following orders were created for panel order CBC &  Differential.  Procedure                               Abnormality         Status                     ---------                               -----------         ------                     CBC Auto Differential[[id number]]        Abnormal            Final result                 Please view results for these tests on the individual orders.    CBC Auto Differential [[id number]]  (Abnormal) Collected: 11/17/22 2024    Specimen: Blood Updated: 11/17/22 2035     WBC 11.20 10*3/mm3      RBC 4.43 10*6/mm3      Hemoglobin 12.6 g/dL      Hematocrit 38.7 %      MCV 87.5 fL      MCH 28.4 pg      MCHC 32.4 g/dL      RDW 15.4 %      RDW-SD 49.4 fl      MPV 8.7 fL      Platelets 361 10*3/mm3      Neutrophil % 88.4 %      Lymphocyte % 6.1 %      Monocyte % 4.9 %      Eosinophil % 0.3 %      Basophil % 0.3 %      Neutrophils, Absolute 9.90 10*3/mm3      Lymphocytes, Absolute 0.70 10*3/mm3      Monocytes, Absolute 0.50 10*3/mm3      Eosinophils, Absolute 0.00 10*3/mm3      Basophils, Absolute 0.00 10*3/mm3      nRBC 0.0 /100 WBC           Imaging Results (Last 24 Hours)     Procedure Component Value Units Date/Time    XR Foot 3+ View Right [[id number]] Collected: 11/17/22 2200     Updated: 11/17/22 2203    Narrative:      DATE OF EXAM:  11/17/2022 8:46 PM     PROCEDURE:  XR FOOT 3+ VW RIGHT-     INDICATIONS:  fall / pain     COMPARISON:  No Comparisons Available     TECHNIQUE:   A minimum of three routine standard radiographic views were obtained of  the right foot.     FINDINGS:  Subtle findings are limited by diffuse osteopenia. Diffuse soft tissue  swelling extending from the ankle to the foot noted. Comminuted  fractures of the distal tibia and fibula are noted.     Irregularity at the medial aspect of the navicular bone is  age-indeterminate. No acute osseous abnormality of the right foot  otherwise noted        Impression:      Large amount soft tissue swelling extending from the ankle dislocation     Irregularity at the medial  aspect of the navicular bone may represent  fracture or secondary ossification center     Electronically Signed By-Osmani Oliver On:11/17/2022 10:01 PM  This report was finalized on [id number] by  Osmani Oliver, .    XR Ankle 3+ View Right [[id number]] Collected: 11/17/22 2158     Updated: 11/17/22 2202    Narrative:      DATE OF EXAM:  11/17/2022 8:45 PM     PROCEDURE:  XR ANKLE 3+ VW RIGHT-     INDICATIONS:  fall / deformity     COMPARISON:  No Comparisons Available     TECHNIQUE:   A minimum of three radiologic views of the right ankle were obtained.     FINDINGS:  Comminuted fracture of the distal fibula involving the distal metaphysis  extending to the metaphysis noted with mild posterior displacement.  Fracture of the medial malleolus in the posterior malleolus noted. Mild  lateral displacement of the ankle joint is noted. Possible remote  fractures off the inferior aspect of the lateral malleolus noted. There  is a large amount of overlying soft tissue swelling.        Impression:      Comminuted trimalleolar fracture dislocation of the ankle     Electronically Signed ByJovi Oliver On:11/17/2022 10:00 PM  This report was finalized on [id number] by  Osmani Oliver, .            Assessment & Plan     Right trimalleolar ankle fracture    Explained she would do best with open reduction internal fixation to avoid malunion.  She understands risk.  Patient understands the risks.  These include bleeding, infection, damage to nerves or vessels, malunion, nonunion, possible need for further surgery, pain, and risk of medical or anesthetic complications including risk of death.  Plan on surgery for 4:00 today.  She will be nonweightbearing on this for 6 weeks afterwards      Taran Marie MD  11/18/22  08:21 EST          Electronically signed by Taran Marie MD at 11/18/22 4021

## 2022-11-22 NOTE — CASE MANAGEMENT/SOCIAL WORK
"Continued Stay Note   [facility]     Patient Name: Kathe Burt  MRN: [id number]  Today's Date: 11/22/2022    Admit Date: 11/17/2022    Plan: [facility] accepted, pending precert. Precert started 11/22. PASRR per facility.   Discharge Plan     Row Name 11/22/22 1340       Plan    Plan [facility] accepted, pending precert. Precert started 11/22. PASRR per facility.    Patient/Family in Agreement with Plan yes    Plan Comments CM notified by [facility] liaison that they will not be able to accept pt d/t being \"at their [employer] limit.\" Referral then sent to patient's 2nd choice [facility] and liaisons Marnie/Lindy. Notified that they can accept pt and would have precert started today 11/22. Pharmacy updated to [facility]. Updated patient at bedside.              Met with patient in room wearing PPE: mask, face shield/goggles.      Maintained distance greater than six feet and spent less than 15 minutes in the room.      Megan Naegele, RN      Office Phone: [phone]  Office Cell: [phone]    "

## 2022-11-22 NOTE — PLAN OF CARE
Problem: Skin Injury Risk Increased  Goal: Skin Health and Integrity  Intervention: Optimize Skin Protection  Recent Flowsheet Documentation  Taken 11/21/2022 2015 by Anahi Jesus LPN  Pressure Reduction Techniques: frequent weight shift encouraged  Head of Bed (HOB) Positioning: HOB at 30-45 degrees  Pressure Reduction Devices: pressure-redistributing mattress utilized  Skin Protection: skin-to-skin areas padded     Problem: Fall Injury Risk  Goal: Absence of Fall and Fall-Related Injury  Intervention: Identify and Manage Contributors  Recent Flowsheet Documentation  Taken 11/21/2022 2015 by Anahi Jesus LPN  Medication Review/Management:   medications reviewed   high-risk medications identified  Self-Care Promotion: independence encouraged     Problem: Fall Injury Risk  Goal: Absence of Fall and Fall-Related Injury  Intervention: Promote Injury-Free Environment  Recent Flowsheet Documentation  Taken 11/21/2022 2230 by Anahi Jesus LPN  Safety Promotion/Fall Prevention:   assistive device/personal items within reach   clutter free environment maintained   fall prevention program maintained   nonskid shoes/slippers when out of bed   safety round/check completed   room organization consistent  Taken 11/21/2022 2015 by Anahi Jesus LPN  Safety Promotion/Fall Prevention:   assistive device/personal items within reach   clutter free environment maintained   fall prevention program maintained   nonskid shoes/slippers when out of bed   room organization consistent   safety round/check completed   Goal Outcome Evaluation:  Plan of Care Reviewed With: patient        Progress: no change   Alert and oriented x 4. Able to verbalize needs and wants. Takes medication whole and tolerates well. External catheter in place and patent. C/O pain to RLE, PRN Wynona administered with positive effect. Phosphorous replaced as per protocol. Does not require O2 therapy at this time. Currently in bed, eyes closed. Rise and fall  of chest observed. Call bell in reach.

## 2022-11-23 VITALS
RESPIRATION RATE: 19 BRPM | HEIGHT: 60 IN | BODY MASS INDEX: 33.76 KG/M2 | TEMPERATURE: 98.4 F | DIASTOLIC BLOOD PRESSURE: 82 MMHG | HEART RATE: 89 BPM | WEIGHT: 171.96 LBS | OXYGEN SATURATION: 95 % | SYSTOLIC BLOOD PRESSURE: 152 MMHG

## 2022-11-23 LAB
ALBUMIN SERPL-MCNC: 3.2 G/DL (ref 3.5–5.2)
ANION GAP SERPL CALCULATED.3IONS-SCNC: 9 MMOL/L (ref 5–15)
BUN SERPL-MCNC: 12 MG/DL (ref 8–23)
BUN/CREAT SERPL: 14.5 (ref 7–25)
CALCIUM SPEC-SCNC: 8.9 MG/DL (ref 8.6–10.5)
CHLORIDE SERPL-SCNC: 103 MMOL/L (ref 98–107)
CO2 SERPL-SCNC: 27 MMOL/L (ref 22–29)
CREAT SERPL-MCNC: 0.83 MG/DL (ref 0.57–1)
EGFRCR SERPLBLD CKD-EPI 2021: 76.9 ML/MIN/1.73
GLUCOSE SERPL-MCNC: 94 MG/DL (ref 65–99)
PHOSPHATE SERPL-MCNC: 2.5 MG/DL (ref 2.5–4.5)
POTASSIUM SERPL-SCNC: 4.1 MMOL/L (ref 3.5–5.2)
SODIUM SERPL-SCNC: 139 MMOL/L (ref 136–145)
WHOLE BLOOD HOLD SPECIMEN: NORMAL

## 2022-11-23 PROCEDURE — 80069 RENAL FUNCTION PANEL: CPT | Performed by: HOSPITALIST

## 2022-11-23 RX ADMIN — POLYETHYLENE GLYCOL 3350 17 G: 17 POWDER, FOR SOLUTION ORAL at 10:16

## 2022-11-23 RX ADMIN — HYDROCODONE BITARTRATE AND ACETAMINOPHEN 1 TABLET: 7.5; 325 TABLET ORAL at 10:16

## 2022-11-23 RX ADMIN — ASPIRIN 325 MG ORAL TABLET 325 MG: 325 PILL ORAL at 10:16

## 2022-11-23 RX ADMIN — SENNOSIDES AND DOCUSATE SODIUM 2 TABLET: 50; 8.6 TABLET ORAL at 10:16

## 2022-11-23 NOTE — PLAN OF CARE
Goal Outcome Evaluation:      Pt rested well through the night.  Will continue to monitor pt status.

## 2022-11-23 NOTE — CASE MANAGEMENT/SOCIAL WORK
Case Management Discharge Note      Final Note: Claire.    Selected Continued Care - Discharged on 11/23/2022 Admission date: 11/17/2022 - Discharge disposition: Skilled Nursing Facility (DC - External)    Destination Coordination complete.    Service Provider Selected Services Address Phone Fax Patient Preferred    [facility] [address] [facility] [place] [address] [phone] --           Transportation Services  Private: Car    Final Discharge Disposition Code: 03 - skilled nursing facility (SNF)

## 2022-11-23 NOTE — PLAN OF CARE
Problem: Skin Injury Risk Increased  Goal: Skin Health and Integrity  Outcome: Ongoing, Progressing     Problem: Fall Injury Risk  Goal: Absence of Fall and Fall-Related Injury  Outcome: Ongoing, Progressing  Intervention: Promote Injury-Free Environment  Recent Flowsheet Documentation  Taken 11/22/2022 1800 by Maylin Stuart RN  Safety Promotion/Fall Prevention: safety round/check completed  Taken 11/22/2022 1600 by Maylin Stuart RN  Safety Promotion/Fall Prevention: safety round/check completed  Taken 11/22/2022 1400 by Maylin Stuart RN  Safety Promotion/Fall Prevention: safety round/check completed  Taken 11/22/2022 1200 by Maylin Stuart RN  Safety Promotion/Fall Prevention: safety round/check completed  Taken 11/22/2022 1000 by Maylin Stuart RN  Safety Promotion/Fall Prevention: safety round/check completed  Taken 11/22/2022 0830 by Maylin Stuart RN  Safety Promotion/Fall Prevention: safety round/check completed     Problem: Adult Inpatient Plan of Care  Goal: Plan of Care Review  Outcome: Ongoing, Progressing  Goal: Patient-Specific Goal (Individualized)  Outcome: Ongoing, Progressing  Goal: Absence of Hospital-Acquired Illness or Injury  Outcome: Ongoing, Progressing  Intervention: Identify and Manage Fall Risk  Recent Flowsheet Documentation  Taken 11/22/2022 1800 by Maylin Stuart RN  Safety Promotion/Fall Prevention: safety round/check completed  Taken 11/22/2022 1600 by Maylin Stuart RN  Safety Promotion/Fall Prevention: safety round/check completed  Taken 11/22/2022 1400 by Maylin Stuart RN  Safety Promotion/Fall Prevention: safety round/check completed  Taken 11/22/2022 1200 by Maylin Stuart RN  Safety Promotion/Fall Prevention: safety round/check completed  Taken 11/22/2022 1000 by Maylin Stuart RN  Safety Promotion/Fall Prevention: safety round/check completed  Taken 11/22/2022 0830 by Maylin Stuart RN  Safety Promotion/Fall Prevention: safety round/check  completed  Goal: Optimal Comfort and Wellbeing  Outcome: Ongoing, Progressing  Intervention: Provide Person-Centered Care  Recent Flowsheet Documentation  Taken 11/22/2022 0830 by Maylin Stuart RN  Trust Relationship/Rapport: care explained  Goal: Readiness for Transition of Care  Outcome: Ongoing, Progressing     Problem: Adjustment to Injury (Hip Fracture Medical Management)  Goal: Optimal Coping with Change in Health Status  Outcome: Ongoing, Progressing  Intervention: Support Psychosocial Response to Injury  Recent Flowsheet Documentation  Taken 11/22/2022 0830 by Maylin Stuart RN  Supportive Measures: active listening utilized     Problem: Bleeding (Hip Fracture Medical Management)  Goal: Absence of Bleeding  Outcome: Ongoing, Progressing     Problem: Bowel Elimination Impaired (Hip Fracture Medical Management)  Goal: Effective Bowel Elimination  Outcome: Ongoing, Progressing  Intervention: Promote Effective Bowel Elimination  Recent Flowsheet Documentation  Taken 11/22/2022 0830 by Maylin Stuart RN  Bowel Elimination Promotion: adequate fluid intake promoted     Problem: Cognitive Decline Risk (Hip Fracture Medical Management)  Goal: Baseline Cognitive Function Maintained  Outcome: Ongoing, Progressing     Problem: Embolism (Hip Fracture Medical Management)  Goal: Absence of Embolism  Outcome: Ongoing, Progressing     Problem: Fracture Stabilization and Management (Hip Fracture Medical Management)  Goal: Fracture Stability  Outcome: Ongoing, Progressing     Problem: Functional Ability Impaired (Hip Fracture Medical Management)  Goal: Optimal Functional Performance  Outcome: Ongoing, Progressing     Problem: Pain (Hip Fracture Medical Management)  Goal: Acceptable Pain Level  Outcome: Ongoing, Progressing     Problem: Urinary Elimination Impaired (Hip Fracture Medical Management)  Goal: Effective Urinary Elimination  Outcome: Ongoing, Progressing     Problem: Pain Acute  Goal: Acceptable Pain  Control and Functional Ability  Outcome: Ongoing, Progressing  Intervention: Prevent or Manage Pain  Recent Flowsheet Documentation  Taken 11/22/2022 0830 by Maylin Stuart RN  Bowel Elimination Promotion: adequate fluid intake promoted  Intervention: Optimize Psychosocial Wellbeing  Recent Flowsheet Documentation  Taken 11/22/2022 0830 by Maylin Stuart, RN  Supportive Measures: active listening utilized     Problem: Surgical Site Infection  Goal: Absence of Infection Signs and Symptoms  Outcome: Ongoing, Progressing   Goal Outcome Evaluation:   Continue with plan of care

## 2022-11-23 NOTE — DISCHARGE SUMMARY
[facility] Medicine Services  Discharge Summary    Date of Service: 22  Patient Name: Kathe Burt  : 1955  MRN: [id number]    Date of Admission: 2022  Discharge Diagnosis: trimalleolar fracutre  Date of Discharge:  22  Primary Care Physician: Gavi Calvert APRN      Presenting Problem:   Fall, initial encounter [W19.XXXA]  Acute pain of right foot [M79.671]  Acute right ankle pain [M25.571]    Active and Resolved Hospital Problems:  Active Hospital Problems    Diagnosis POA   • **Trimalleolar fracture of ankle, closed, right, initial encounter [S82.851A] Yes   • Fall, initial encounter [W19.XXXA] Yes   • History of CVA (cerebrovascular accident) [Z86.73] Not Applicable   • Right sided weakness due to previous CVA [R53.1] Yes      Resolved Hospital Problems   No resolved problems to display.         Hospital Course     Hospital Course:  Kathe Burt is a 67 y.o. female with fraciture of ankle    Treated for       Trimalleolar fracture of ankle  -Ankle x-ray reviewed  -RLE placed in posterior splint and stirrup plaster splint in ED  -Orthopedic surgeon consult appreciated and following  -S/p ORIF right ankle on 2022.  -Pain medication ordered as needed  -Patient will need PT/OT following surgery due to multiple recent falls with injuries   -Case management consult for possible rehab placement due to multiple recent falls with injury  - Toe-touch weight-bear for 6 weeks.  Likely need rehab placement   Constipation; stool softeners.  Patient has a previous history of a CVA approximately 18 years ago  -Residual right-sided weakness noted  -Patient reports no home medications   Acute blood loss anemia; hemoglobin 12.6 on arrival currently 10.6 continue to monitor.   Acute kidney injury; most likely prerenal serum creatinine on admission 1.4, up to 1.59 today patient received IV fluid,       DISCHARGE Follow Up Recommendations for labs and diagnostics:  pcp      Reasons For Change In Medications and Indications for New Medications:      Day of Discharge     Vital Signs:  Temp:  [98.4 °F (36.9 °C)-98.9 °F (37.2 °C)] 98.4 °F (36.9 °C)  Heart Rate:  [82-83] 82  Resp:  [19-20] 19  BP: ()/(54) 117/54    Physical Exam:  Physical Exam  Constitutional:       Appearance: Normal appearance.   HENT:      Head: Normocephalic and atraumatic.   Eyes:      Pupils: Pupils are equal, round, and reactive to light.   Cardiovascular:      Rate and Rhythm: Normal rate and regular rhythm.      Pulses: Normal pulses.      Heart sounds: Normal heart sounds.   Pulmonary:      Effort: Pulmonary effort is normal.   Neurological:      Mental Status: She is alert.              Pertinent  and/or Most Recent Results     LAB RESULTS:      Lab 11/20/22  0114 11/19/22  0239 11/18/22  0510 11/17/22 2024   WBC 6.90 6.30 6.80 11.20*   HEMOGLOBIN 10.6* 11.0* 11.3* 12.6   HEMATOCRIT 33.0* 35.6 35.6 38.7   PLATELETS 336 180 348 361   NEUTROS ABS 4.50 5.70 4.60 9.90*   LYMPHS ABS 1.90 0.40* 1.40 0.70   MONOS ABS 0.50 0.20 0.70 0.50   EOS ABS 0.00 0.00 0.10 0.00   MCV 89.9 92.2 89.8 87.5         Lab 11/23/22  0758 11/22/22  0239 11/21/22  1833 11/20/22  1939 11/20/22  0114 11/19/22  0239   SODIUM 139 139 142  --  139 138   POTASSIUM 4.1 4.1 4.1  --  4.1 4.5   CHLORIDE 103 105 107  --  103 105   CO2 27.0 26.0 26.0  --  18.0* 16.0*   ANION GAP 9.0 8.0 9.0  --  18.0* 17.0*   BUN 12 14 17  --  23 19   CREATININE 0.83 0.85 0.94  --  1.50* 1.59*   EGFR 76.9 74.7 66.2  --  37.8* 35.2*   GLUCOSE 94 100* 115*  --  95 122*   CALCIUM 8.9 8.5* 7.9*  --  8.8 8.6   PHOSPHORUS 2.5 3.0 2.4* 3.4 2.4* 3.5   HEMOGLOBIN A1C  --   --   --   --   --  5.3         Lab 11/23/22  0758 11/22/22  0239 11/21/22  1833 11/20/22  0114 11/19/22 0239 11/17/22 2024   TOTAL PROTEIN  --   --   --   --   --  6.8   ALBUMIN 3.20* 2.90* 3.40* 3.60 3.50 4.10   GLOBULIN  --   --   --   --   --  2.7   ALT (SGPT)  --   --   --   --   --  16    AST (SGOT)  --   --   --   --   --  16   BILIRUBIN  --   --   --   --   --  0.4   ALK PHOS  --   --   --   --   --  174*                     Brief Urine Lab Results  (Last result in the past 365 days)      Color   Clarity   Blood   Leuk Est   Nitrite   Protein   CREAT   Urine HCG        11/21/22 0250 Yellow   Clear   Negative   Negative   Negative   Negative               Microbiology Results (last 10 days)     ** No results found for the last 240 hours. **          XR Shoulder 2+ View Right    Result Date: 11/2/2022  Impression: Transverse impaction fracture of the right humeral neck. The humeral head is high riding raising question of rotator cuff osteoarthropathy.  Electronically Signed By-Angel Reaves MD On:11/2/2022 4:19 PM This report was finalized on [id number] by  Angel Reaves MD.    XR Humerus Right    Result Date: 11/2/2022  Impression: Transverse impaction fracture of the right humeral neck. This was discussed in detail under the separately dictated right shoulder x-ray report.  Electronically Signed By-Angel Reaves MD On:11/2/2022 4:21 PM This report was finalized on [id number] by  Angel Reaves MD.    XR Elbow 2 View Right    Result Date: 11/2/2022  Impression: No acute fracture or dislocation.  Electronically Signed By-Angel Reaves MD On:11/2/2022 4:17 PM This report was finalized on [id number] by  Angel Reaves MD.    XR Ankle 3+ View Right    Result Date: 11/17/2022  Impression: Comminuted trimalleolar fracture dislocation of the ankle  Electronically Signed By-Osmani Oliver On:11/17/2022 10:00 PM This report was finalized on [id number] by  Osmani Oliver, .    XR Foot 3+ View Right    Result Date: 11/17/2022  Impression: Large amount soft tissue swelling extending from the ankle dislocation  Irregularity at the medial aspect of the navicular bone may represent fracture or secondary ossification center  Electronically Signed By-Osmani Oliver On:11/17/2022 10:01 PM This report was  finalized on [id number] by  Osmani Oliver, .    US Renal Bilateral    Result Date: 11/21/2022  Impression:  1. No hydronephrosis 2. Mild bilateral cortical thinning 3. Bladder is grossly unremarkable in appearance  Electronically Signed By-Osmani Oliver On:11/21/2022 7:43 AM This report was finalized on [id number] by  Osmani Oliver, .                  Labs Pending at Discharge:      Procedures Performed  Procedure(s):  ANKLE OPEN REDUCTION INTERNAL FIXATION         Consults:   Consults     Date and Time Order Name Status Description    11/18/2022  8:32 PM Inpatient Consult to Hospitalist      11/18/2022  8:20 AM Inpatient Nephrology Consult Completed     11/17/2022  9:44 PM Ortho (on-call MD unless specified) Completed     11/17/2022  9:44 PM Hospitalist (on-call MD unless specified)              Discharge Details        Discharge Medications      Continue These Medications      Instructions Start Date   aspirin 81 MG EC tablet   81 mg, Oral, Daily      atorvastatin 40 MG tablet  Commonly known as: LIPITOR   40 mg, Oral, Nightly      docusate sodium 100 MG capsule  Commonly known as: COLACE   100 mg, Oral, Daily      FLUoxetine 20 MG capsule  Commonly known as: PROzac   40 mg, Oral, Daily      HYDROcodone-acetaminophen 5-325 MG per tablet  Commonly known as: NORCO   1 tablet, Oral, Every 6 Hours PRN      levothyroxine 50 MCG tablet  Commonly known as: SYNTHROID, LEVOTHROID   50 mcg, Oral, Daily      lisinopril 2.5 MG tablet  Commonly known as: PRINIVIL,ZESTRIL   2.5 mg, Oral, Daily      methocarbamol 500 MG tablet  Commonly known as: ROBAXIN   1,000 mg, Oral, 2 Times Daily      pantoprazole 40 MG EC tablet  Commonly known as: PROTONIX   40 mg, Oral, Daily      vitamin D 1.25 MG (78207 UT) capsule capsule  Commonly known as: ERGOCALCIFEROL   50,000 Units, Oral, Every 7 Days             No Known Allergies      Discharge Disposition:   Skilled Nursing Facility (DC - External)    Diet:  Hospital:  Diet  Order   Procedures   • Diet: Regular/House Diet; Texture: Regular Texture (IDDSI 7); Fluid Consistency: Thin (IDDSI 0)         Discharge Activity:         CODE STATUS:  Code Status and Medical Interventions:   Ordered at: 11/18/22 2032     Level Of Support Discussed With:    Patient     Code Status (Patient has no pulse and is not breathing):    CPR (Attempt to Resuscitate)     Medical Interventions (Patient has pulse or is breathing):    Full         No future appointments.        Time spent on Discharge including face to face service:  12 minutes    This patient has been examined wearing appropriate Personal Protective Equipment and discussed with hospital infection control department. 11/23/22      Signature: Electronically signed by Jamel Amaro MD, 11/23/22, 09:30 EST.  [facility] Hospitalist Team

## 2022-11-23 NOTE — PROGRESS NOTES
Seen and examined    pedning placement    Review of Systems   All other systems reviewed and are negative.    Temp:  [98.4 °F (36.9 °C)-98.9 °F (37.2 °C)] 98.4 °F (36.9 °C)  Heart Rate:  [82-83] 82  Resp:  [19-20] 19  BP: ()/(54) 117/54  I/O last 3 completed shifts:  In: 840 [P.O.:840]  Out: 1300 [Urine:1300]  I/O this shift:  In: -   Out: 500 [Urine:500]    Physical Exam  Constitutional:       Appearance: Normal appearance.   Eyes:      Pupils: Pupils are equal, round, and reactive to light.   Cardiovascular:      Rate and Rhythm: Normal rate and regular rhythm.      Pulses: Normal pulses.   Pulmonary:      Effort: Pulmonary effort is normal.   Neurological:      Mental Status: She is alert.           Trimalleolar fracture of ankle, closed, right, initial encounter    Fall, initial encounter    History of CVA (cerebrovascular accident)    Right sided weakness due to previous CVA    Trimalleolar fracture of ankle  -Ankle x-ray reviewed  -RLE placed in posterior splint and stirrup plaster splint in ED  -Orthopedic surgeon consult appreciated and following  -S/p ORIF right ankle on 11/18/2022.  -Pain medication ordered as needed  -Patient will need PT/OT following surgery due to multiple recent falls with injuries   -Case management consult for possible rehab placement due to multiple recent falls with injury  - Toe-touch weight-bear for 6 weeks.  Likely need rehab placement   Constipation; stool softeners.  Patient has a previous history of a CVA approximately 18 years ago  -Residual right-sided weakness noted  -Patient reports no home medications   Acute blood loss anemia; hemoglobin 12.6 on arrival currently 10.6 continue to monitor.   Acute kidney injury; most likely prerenal serum creatinine on admission 1.4, up to 1.59 today patient received IV fluid, nephrology consulted and following.  Renal ultrasound pending   Hyperglycemia; stress/reactive check A1c.  Disposition; PT OT eval, await placement in  subacute rehab.  Renal ultrasound pending.  DVT prophylaxis:  Mechanical DVT prophylaxis orders are present.

## 2022-11-23 NOTE — CASE MANAGEMENT/SOCIAL WORK
Continued Stay Note  WILLARD Hunt     Patient Name: Kathe Burt  MRN: [id number]  Today's Date: 11/23/2022    Admit Date: 11/17/2022    Plan: [facility] accepted and precert approved 11/23. PASRR per facility.   Discharge Plan     Row Name 11/23/22 1103       Plan    Plan [facility] accepted and precert approved 11/23. PASRR per facility.    Patient/Family in Agreement with Plan yes    Plan Comments CM notified by [facility] liaison Marnie that precert is approved 11/23 and pt is good to admit today. Notified nursing and Dr Amaro per secure chat. Attempted to see pt at bedside with IMM copy but pt had already discharged.              Phone communication or documentation only - no physical contact with patient or family.      Megan Naegele, RN      Office Phone: [phone]  Office Cell: [phone]

## (undated) DEVICE — DRILL BIT

## (undated) DEVICE — GAUZE,SPONGE,FLUFF,6"X6.75",STRL,5/TRAY: Brand: MEDLINE

## (undated) DEVICE — SYR BLUNT/NDL 10ML 18G 1 1/2IN

## (undated) DEVICE — DRSNG GZ CURAD XEROFORM PETROLTM OVERWRP 1X8IN STRL

## (undated) DEVICE — APPL CHLORAPREP HI/LITE TINTED 10.5ML ORNG

## (undated) DEVICE — UNDERGLV SURG BIOGEL INDICAT PI SZ8 BLU

## (undated) DEVICE — DRIVER STAR 15 SELF RETAINING: Brand: ORTHOLOC™ 3DI

## (undated) DEVICE — BNDG ELAS MATRX V/CLS 4IN 5YD LF

## (undated) DEVICE — PAD UNDERCAST WYTEX 4IN 4YD LF STRL

## (undated) DEVICE — SPLNT 1 STEP 4INX30IN

## (undated) DEVICE — STAPLER, SKIN, 35W, A: Brand: MEDLINE INDUSTRIES, INC.

## (undated) DEVICE — TOWEL,OR,DSP,ST,NATURAL,DLX,4/PK,20PK/CS: Brand: MEDLINE

## (undated) DEVICE — SPLNT ORTHOGLASS 4INX15FT

## (undated) DEVICE — SPNG GZ AVANT 6PLY 4X4IN STRL PK/2

## (undated) DEVICE — SOL IRRIG NACL 1000ML

## (undated) DEVICE — UNDRGLV SURG BIOGEL PIMICROINDICATOR SYNTH SZ8 LF STRL

## (undated) DEVICE — PAD UNDERCAST WYTEX 6IN 4YD LF STRL

## (undated) DEVICE — TOWEL,OR,DSP,ST,WHITE,DLX,4/PK,20PK/CS: Brand: MEDLINE

## (undated) DEVICE — C-ARM: Brand: UNBRANDED

## (undated) DEVICE — 3M™ STERI-DRAPE™ U-DRAPE 1015: Brand: STERI-DRAPE™

## (undated) DEVICE — SOLUTION,WATER,IRRIGATION,1000ML,STERILE: Brand: MEDLINE

## (undated) DEVICE — SLV SCD CALF HEMOFORCE DVT THERP REPROC MD

## (undated) DEVICE — SUT VIC 2/0 CT2 27IN J269H

## (undated) DEVICE — KT SURG TURNOVER 050

## (undated) DEVICE — GLV SURG SENSICARE PI ORTHO SZ8 LF STRL

## (undated) DEVICE — CUFF TOURNI 1BLADDER 1PRT 30IN STRL

## (undated) DEVICE — PK EXTREM 50